# Patient Record
Sex: FEMALE | Race: WHITE | Employment: FULL TIME | URBAN - METROPOLITAN AREA
[De-identification: names, ages, dates, MRNs, and addresses within clinical notes are randomized per-mention and may not be internally consistent; named-entity substitution may affect disease eponyms.]

---

## 2019-01-21 ENCOUNTER — HOSPITAL ENCOUNTER (OUTPATIENT)
Dept: LAB | Age: 49
Discharge: HOME | End: 2019-01-21

## 2019-01-21 PROCEDURE — 88305 TISSUE EXAM BY PATHOLOGIST: CPT

## 2022-02-10 ENCOUNTER — HOSPITAL ENCOUNTER (OUTPATIENT)
Dept: PHYSICAL THERAPY | Age: 52
Discharge: HOME | End: 2022-02-10
Attending: FAMILY MEDICINE
Payer: COMMERCIAL

## 2022-02-10 DIAGNOSIS — M54.16 LUMBAR RADICULOPATHY: ICD-10-CM

## 2022-02-10 PROCEDURE — 97110 THERAPEUTIC EXERCISES: CPT

## 2022-02-10 PROCEDURE — 97162 PT EVAL MOD COMPLEX 30 MIN: CPT

## 2022-02-10 NOTE — THERAPY EVALUATION
Tracy Acosta  : 1970  Primary: Trevor Wilkins o  Secondary:  [address]  at [facility] NICK HART  [address] [facility], [address] [place] [address],[address], [facility] [address].  [phone]   [phone]       OUTPATIENT PHYSICAL THERAPY:Initial Assessment 2/10/2022     ICD-10: Treatment Diagnosis: Lumbar radiculopathy [M54.16] ,M54.50    Low back pain, unspecified,M25. 552 Pain in L hip,M54.9 Upper back pain  Precautions/Allergies:   Simvastatin   TREATMENT PLAN:  Effective Dates: 2/10/2022 TO 2022 (90 days). Frequency/Duration: 2 times a week for 90 Day(s) MEDICAL/REFERRING DIAGNOSIS:  Lumbar radiculopathy [M54.16]   DATE OF ONSET: 2021  REFERRING PHYSICIAN: Ke Mujica, *  MD Orders: Deepa Stubbs and treat  Return MD Appointment: 22     INITIAL ASSESSMENT:  Ms. Junior Cook presents with R sided low back pain onset 2021, L hip pain onset about a month ago and upper back pain on set 2021. She is a good candidate for PT for goals listed below. PROBLEM LIST (Impacting functional limitations):  1. Increased Pain  2. Decreased Activity Tolerance  3. Decreased Flexibility/Joint Mobility  4. Decreased Porter with Home Exercise Program INTERVENTIONS PLANNED: (Treatment may consist of any combination of the following)  1. Home Exercise Program (HEP)  2. Manual Therapy  3. Therapeutic Exercise/Strengthening     GOALS: (Goals have been discussed and agreed upon with patient.)  Short-Term Functional Goals: Time Frame: 2 weeks  1. Pt independent with HEP and sitting, sleeping and standing positions for healthy back. 2. Pt to report a decrease in symptoms- either intensity or frequency  Discharge Goals: Time Frame: 6-8 weeks  1. Spine motions WNL to allow for less pain with daily activities. 2. Pt able to walk 10-15 minutes level ground without increase in pain. 3. Pt to report ability to stand for work without increase in pain. 4. Improve Oswestry score by 5 or greater  5.  Independent with HEP to maintain gains made in PT    OUTCOME MEASURE:   Tool Used: Modified Oswestry Low Back Pain Questionnaire  Score:  Initial: 11/50  Most Recent: X/50 (Date: -- )   Interpretation of Score: Each section is scored on a 0-5 scale, 5 representing the greatest disability. The scores of each section are added together for a total score of 50. MEDICAL NECESSITY:   · Patient is expected to demonstrate progress in strength, range of motion and functional technique to decrease pain and improve function. .  REASON FOR SERVICES/OTHER COMMENTS:  · Patient continues to require skilled intervention due to pain in multiple joints effecting daily activities. .  Total Duration: 55 minutes  PT Patient Time In/Time Out  Time In: 0945  Time Out: 1040    Rehabilitation Potential For Stated Goals: Good  Regarding Silvester Lundborg Miller's therapy, I certify that the treatment plan above will be carried out by a therapist or under their direction. Thank you for this referral,    Corinna Mcallister, PT      Referring Physician Signature: Lisa Musa, * _______________________________ Date _____________       PAIN/SUBJECTIVE:   Initial:    J1/10 low back pain,  1/10 hip  2/10 upper back  now Post Session:  0/10   HISTORY:   History of Injury/Illness (Reason for Referral): Pt presents with pain in R low back pain and L hip pain. Low back pain is mostly in standing and hip pain is mostly when sitting or lying down. Has to change positions constantly when sitting to relieve hip pain. Pt also complains of tightness in upper back trapezius area. Pt states it was worse st the beginning of Covid. 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 Other time is sitting in front of computer. Low back burning pain is the worst.  She previously walked for exercise but can only walk on level surfaces.   Pt states pain stops her from walking but after 2-3 minutes of sitting can begin walking again. Past Medical History/Comorbidities:   Ms. Becca Raya  has a past medical history of Elevated C-reactive protein (CRP) (01/31/2022), High cholesterol, and Kidney stones, calcium oxalate. Ms. Becca Raya  has a past surgical history that includes hx other surgical (Right); hx other surgical; and hx colonoscopy. Social History/Living Environment:     lives with  and family. Stairs to bedroom in home  Prior Level of Function/Work/Activity:   was managing activities with no pain. Ambulatory/Rehab Services H2 Model Falls Risk Assessment   Risk Factors:       No Risk Factors Identified Ability to Rise from Chair:       (1)  Pushes up, successful in one attempt   Falls Prevention Plan:       No modifications necessary   Total: (5 or greater = High Risk): 1   ©2010 [facility]. All Rights Reserved. [facility] Patent #[id number]. Federal Law prohibits the replication, distribution or use without written permission from [facility] [employer]   Current Medications:       Current Outpatient Medications:     atorvastatin (LIPITOR) 20 mg tablet, Take 1 Tablet by mouth daily. , Disp: 90 Tablet, Rfl: 0    prazosin (MINIPRESS) 2 mg capsule, 1-2 tabs by mouth at bedtime, Disp: 90 Capsule, Rfl: 0   Date Last Reviewed:  2/10/2022   Number of Personal Factors/Comorbidities that affect the Plan of Care: 1-2: MODERATE COMPLEXITY   EXAMINATION:   Observation/Orthostatic Postural Assessment:          Pt ambulates with normal gait pattern.   Reports stiff getting up from sitting after sitting long periods  Palpation:          None today  ROM:          Trunk - flexion - finger tips to shin- increase in back pain                   Ext- 70% normal - back pain across LB                  L lateral glide-70%  Increase in R LB pain                  R lateral glide- 70% increase in L hip pain  Strength:          LE strength - grossly WNL  Slight decrease in strength R to L in HS, ant tib and hip abd  Functional Mobility: Transfers:  Independent         Bed Mobility:  No pain lying prone  Or supine with knees bent  Repeated motions-Standing extensions- no real change in symptoms                               Prone press ups- decrease in R LB pain when sitting  Possible extension responder will start extension ex and monitor     Body Structures Involved:  1. Nerves  2. Joints  3. Muscles Body Functions Affected:  1. Neuromusculoskeletal  2. Movement Related Activities and Participation Affected:  1. General Tasks and Demands  2.  Mobility   Number of elements (examined above) that affect the Plan of Care: 3: MODERATE COMPLEXITY   CLINICAL PRESENTATION:   Presentation: Evolving clinical presentation with changing clinical characteristics: MODERATE COMPLEXITY   CLINICAL DECISION MAKING:   Use of outcome tool(s) and clinical judgement create a POC that gives a: Questionable prediction of patient's progress: MODERATE COMPLEXITY

## 2022-02-10 NOTE — PROGRESS NOTES
Aster Alberts  : 1970  Payor: Js Medina / Plan: Renita Ace HMO / Product Type: HMO /  [address] at [facility] NICK HART  [facility], [address], [address] [place] [address]  [phone]   [phone]       OUTPATIENT PHYSICAL THERAPY: Daily Treatment Note 2/10/2022  Visit Count: 1     ICD-10: Treatment Diagnosis:  Lumbar radiculopathy [M54.16] ,M54.50    Low back pain, unspecified,M25. 552 Pain in L hip,M54.9 Upper back pain  Precautions/Allergies:   Simvastatin   TREATMENT PLAN:  Effective Dates: 2/10/2022 TO 2022 (90 days). Frequency/Duration: 2 times a week for 90 Day(s) MEDICAL/REFERRING DIAGNOSIS:  Lumbar radiculopathy [M54.16]   DATE OF ONSET: 2021  REFERRING PHYSICIAN: Lynn Padron, *  MD Orders: Val Oneal and treat  Return MD Appointment: 22       Pre-treatment Symptoms/Complaints:  Pt with R LB pain, L hip pain and upper back pain  Pain: Initial:   1/10 today Post Session:  010   Medications Last Reviewed:  2/10/2022    Updated Objective Findings:   See evaluation note from today     TREATMENT:     THERAPEUTIC EXERCISE: (15 minutes):  Exercises per grid below to improve mobility. Required minimal visual and verbal cues to promote proper body alignment, promote proper body posture and promote proper body mechanics. Progressed range and repetitions as indicated. Date:  2/10/22 Date:   Date:     Activity/Exercise Parameters Parameters Parameters   Standing trunk extension 1 x 10     Prone press up 1 x 10                                   Discussed proper sitting and sleeping position  Avoiding flexed positions      HEP: trunk ext every 2-3 hours 10 reps  [employer] Portal    Treatment/Session Summary:    · Response to Treatment:  Pt had decrease in pain in sitting after performing extensions. Also no pain in prone position.   · Communication/Consultation:  None today  · Equipment provided today:  None today  · Recommendations/Intent for next treatment session: Next visit will focus on spine mobility and functional activities. .    Total Treatment Billable Duration:  15 minutes therapeutic exercise.   PT Patient Time In/Time Out  Time In: 0945  Time Out: [address], PT    Future Appointments   Date Time Provider Carolyn Arana   2/16/2022 11:20 AM Shweta Philip DO [facility] PRE PRE   2/16/2022  1:45 PM Lynita Chris, PT [facility]   2/18/2022  1:45 PM Abi Rist, Abbi, PT [facility]   2/21/2022  1:00 PM Abi Rist, Abbi, PT [facility]   2/23/2022 10:30 AM Lynita Chris, PT [facility]   3/9/2022  9:45 AM Abi Rist, Abbi, PT [facility]   3/11/2022  9:45 AM Lynita Chris, PT [facility]   3/16/2022  9:45 AM Lynita Chris, PT [facility]   3/18/2022  9:45 AM Lynita Chris, PT [facility]   3/23/2022  9:45 AM Lynita Chris, PT [facility]   3/25/2022  9:00 AM Lynita Chris, PT [facility]   3/25/2022 10:30 AM Ira Reece MD [facility] CMW CMW   3/30/2022  9:45 AM Lynita Chris, PT [facility]   4/1/2022  9:45 AM Lynita Chris, PT [facility]   5/16/2022  8:40 AM Madelin Astorga MD [address] [facility]

## 2022-02-16 ENCOUNTER — APPOINTMENT (OUTPATIENT)
Dept: PHYSICAL THERAPY | Age: 52
End: 2022-02-16
Attending: FAMILY MEDICINE
Payer: COMMERCIAL

## 2022-02-17 ENCOUNTER — HOSPITAL ENCOUNTER (OUTPATIENT)
Dept: LAB | Age: 52
Discharge: HOME | End: 2022-02-17

## 2022-02-17 PROCEDURE — 88305 TISSUE EXAM BY PATHOLOGIST: CPT

## 2022-02-18 ENCOUNTER — HOSPITAL ENCOUNTER (OUTPATIENT)
Dept: PHYSICAL THERAPY | Age: 52
Discharge: HOME | End: 2022-02-18
Attending: FAMILY MEDICINE
Payer: COMMERCIAL

## 2022-02-18 PROCEDURE — 97110 THERAPEUTIC EXERCISES: CPT

## 2022-02-18 NOTE — PROGRESS NOTES
Tracy Acosta  : 1970  Payor: Jeffrey King / Plan: Deejay Michaud HMO / Product Type: HMO /  [address]  at [facility] NICK HART  [address] [facility], [address], [facility].  [phone]   [phone]       OUTPATIENT PHYSICAL THERAPY: Daily Treatment Note 2022  Visit Count: 2     ICD-10: Treatment Diagnosis:  Lumbar radiculopathy [M54.16] ,M54.50    Low back pain, unspecified,M25. 552 Pain in L hip,M54.9 Upper back pain  Precautions/Allergies:   Simvastatin   TREATMENT PLAN:  Effective Dates: 2/10/2022 TO 2022 (90 days). Frequency/Duration: 2 times a week for 90 Day(s) MEDICAL/REFERRING DIAGNOSIS:  Radiculopathy, lumbar region [M54.16]   DATE OF ONSET: 2021  REFERRING PHYSICIAN: Ke Mujica, *  MD Orders: Deepa Stubbs and treat  Return MD Appointment: 22       Pre-treatment Symptoms/Complaints:  Pt states the muscle aches or fatigued feeling in her LE is more. Almost constant. The exercises relieve the burning sensation. Pain: Initial:   1/10 today Post Session:  0/10   Medications Last Reviewed:  2022    Updated Objective Findings: Tight quads R more than L       TREATMENT:     THERAPEUTIC EXERCISE: (15 minutes):  Exercises per grid below to improve mobility. Required minimal visual and verbal cues to promote proper body alignment, promote proper body posture and promote proper body mechanics. Progressed range and repetitions as indicated. Date:  2/10/22 Date:  22 Date:     Activity/Exercise Parameters Parameters Parameters   Standing trunk extension 1 x 10 1 x 10    Prone press up 1 x 10 2 x 5    Prone press up with therapist overpress  1 x 5    Quad stretching wit strap   3 x 30  each    Calf stretching  3 x 30                      HEP: trunk ext every 2-3 hours 10 reps, quad stretching , calf stretch   [employer] Portal    Treatment/Session Summary:    · Response to Treatment:  Pt reported less fatigue feeling after stretching. · Communication/Consultation:  None today  · Equipment provided today:  None today  · Recommendations/Intent for next treatment session: Next visit will focus on spine mobility and functional activities. .    Total Treatment Billable Duration:  30 minutes therapeutic exercise.   PT Patient Time In/Time Out  Time In: 0145  Time Out: 0230    Rochelle Cohen PT    Future Appointments   Date Time Provider Carolyn Arana   2/21/2022  1:00 PM Chloe Garduno, PT [facility]   2/23/2022 10:30 AM Chloe Garduno, PT [facility]   3/9/2022  9:45 AM Abbi Velasquez, PT [facility]   3/11/2022  9:45 AM Abbi Velasquez, PT [facility]   3/16/2022  9:45 AM Nuris Epps, PT [facility]   3/18/2022  9:45 AM Nuris Epps, PT [facility]   3/23/2022  9:45 AM Chloe Garduno, PT [facility]   3/25/2022  9:00 AM Chloe Garduno, PT [facility]   3/25/2022 10:30 AM Sandra Yuan MD [facility] CMW CMW   3/30/2022  9:45 AM Chloe Garduno, PT [facility]   4/1/2022  9:45 AM Chloe Garduno, PT [facility]   5/16/2022  8:40 AM Patti Sevilla MD [address] [place] [address]

## 2022-02-21 ENCOUNTER — APPOINTMENT (OUTPATIENT)
Dept: PHYSICAL THERAPY | Age: 52
End: 2022-02-21
Attending: FAMILY MEDICINE
Payer: COMMERCIAL

## 2022-02-23 ENCOUNTER — HOSPITAL ENCOUNTER (OUTPATIENT)
Dept: PHYSICAL THERAPY | Age: 52
End: 2022-02-23
Attending: FAMILY MEDICINE
Payer: COMMERCIAL

## 2022-03-09 ENCOUNTER — HOSPITAL ENCOUNTER (OUTPATIENT)
Dept: PHYSICAL THERAPY | Age: 52
Discharge: HOME | End: 2022-03-09
Attending: FAMILY MEDICINE
Payer: COMMERCIAL

## 2022-03-09 PROCEDURE — 97110 THERAPEUTIC EXERCISES: CPT

## 2022-03-09 NOTE — PROGRESS NOTES
Franki Casas  : 1970  Payor: Anderson Rout / Plan: Carlos Enrique Bhardwaj HMO / Product Type: HMO /  [address] at [facility] NICK HART  [address] [place] [address], [address], [place] [address].  [phone]   [phone]       OUTPATIENT PHYSICAL THERAPY: Daily Treatment Note 3/9/2022  Visit Count: 3     ICD-10: Treatment Diagnosis:  Lumbar radiculopathy [M54.16] ,M54.50    Low back pain, unspecified,M25. 552 Pain in L hip,M54.9 Upper back pain  Precautions/Allergies:   Simvastatin   TREATMENT PLAN:  Effective Dates: 2/10/2022 TO 2022 (90 days). Frequency/Duration: 2 times a week for 90 Day(s) MEDICAL/REFERRING DIAGNOSIS:  Radiculopathy, lumbar region [M54.16]   DATE OF ONSET: 2021  REFERRING PHYSICIAN: Francy Ferrera, *  MD Orders: Raeann Bernard and treat  Return MD Appointment: 22       Pre-treatment Symptoms/Complaints:  Pt states she is having less back pain while working. [place] and was able to walk about 20 minutes but then the burning sensation was severe from low back. She states the fatigue in her LE is better she feels like the exercises are helping with that. Pain: Initial:   2/10 today Post Session:  0/10   Medications Last Reviewed:  3/9/2022    Updated Objective Findings:      TREATMENT:     THERAPEUTIC EXERCISE: (35 minutes):  Exercises per grid below to improve mobility and strength. Required minimal visual and verbal cues to promote proper body alignment, promote proper body posture and promote proper body mechanics. Progressed resistance, range and repetitions as indicated.    Date:  2/10/22 Date:  22 Date:  3/9/22   Activity/Exercise Parameters Parameters Parameters   Standing trunk extension 1 x 10 1 x 10 1 x 10   Prone press up 1 x 10 2 x 5 1 x 10   Prone press up with therapist overpress  1 x 5    Quad stretching wit strap   3 x 30  each 10 active with therapist overpressure   Calf stretching  3 x 30    bridging   10   oppos extremity ext in quadraped   10 each side   Clam shell   10 each side               HEP: added bridging, bird dog, clamshell for strength  Med[employer] Portal    Treatment/Session Summary:    · Response to Treatment:  Pain has improved. Did well with new ex. · Communication/Consultation:  None today  · Equipment provided today:  None today  · Recommendations/Intent for next treatment session: Next visit will focus on spine mobility and functional activities. .    Total Treatment Billable Duration:  30 minutes therapeutic exercise.        Delia Caceres, PT    Future Appointments   Date Time Provider Carolyn Arana   3/9/2022  9:45 AM Stana Lurdes, PT [facility]   3/11/2022  9:45 AM Stana Lurdes, PT [facility]   3/16/2022  9:45 AM Bridget Starcher, PT [facility]   3/18/2022  9:45 AM Bridget Starcher, PT [facility]   3/23/2022  9:45 AM Stana Lurdes, PT [facility]   3/25/2022  9:00 AM Stana Lurdes, PT [facility]   3/25/2022 10:30 AM Padmini Grande MD [facility] CMW CMW   3/30/2022  9:45 AM Stana Lurdes, PT [facility]   2022  9:45 AM Stana Lurdes, PT [facility]   2022  8:40 AM Tonny Snyder MD 1300 [facility]

## 2022-03-11 ENCOUNTER — APPOINTMENT (OUTPATIENT)
Dept: PHYSICAL THERAPY | Age: 52
End: 2022-03-11
Attending: FAMILY MEDICINE
Payer: COMMERCIAL

## 2022-03-16 ENCOUNTER — HOSPITAL ENCOUNTER (OUTPATIENT)
Dept: PHYSICAL THERAPY | Age: 52
Discharge: HOME | End: 2022-03-16
Attending: FAMILY MEDICINE
Payer: COMMERCIAL

## 2022-03-16 PROCEDURE — 97110 THERAPEUTIC EXERCISES: CPT

## 2022-03-16 NOTE — PROGRESS NOTES
Helena Zhu  : 1970  Payor: Ken Thomas / Plan: Nolia Sol HMO / Product Type: HMO /  [address] at [facility] NICK HART  1101 [facility], [address], [place] [address].  [phone]   [phone]       OUTPATIENT PHYSICAL THERAPY: Daily Treatment Note 3/16/2022  Visit Count: 4     ICD-10: Treatment Diagnosis:  Lumbar radiculopathy [M54.16] ,M54.50    Low back pain, unspecified,M25. 552 Pain in L hip,M54.9 Upper back pain  Precautions/Allergies:   Simvastatin   TREATMENT PLAN:  Effective Dates: 2/10/2022 TO 2022 (90 days). Frequency/Duration: 2 times a week for 90 Day(s) MEDICAL/REFERRING DIAGNOSIS:  Radiculopathy, lumbar region [M54.16]   DATE OF ONSET: 2021  REFERRING PHYSICIAN: Obed Morley, *  MD Orders: Rayna Dorantes and treat  Return MD Appointment: 22       Pre-treatment Symptoms/Complaints: Says her pain is low intensity. Stays pretty constant. Pain is mostly to R lumbosacral spine. Has been doing her HEP and feels these help. Pain: Initial:   1/10 today Post Session:  0/10   Medications Last Reviewed:  3/16/2022    Updated Objective Findings:   No new measure. TREATMENT:     THERAPEUTIC EXERCISE: (45 Minutes): Instruction and performance of lumbar mobility exercises with hook-lying pelvic tilts, hip shift pelvic pull backs, and lower trunk rotations, x10-15 each. Assisted LE stretching with Active Isolated Stretching to R and L posterior hip, hamstrings in 90/90 and SLR, lateral hip, gastroc and hip flexors/quads in side-lying, 3\"x10 each; assisted sciatic nerve tensioner in supine 90/90 and SLR with active ankle dorsiflexion, 3\"x10 each. Instruction in active hamstring stretch in supine 90/90. Good return demonstration. Reviewed and performed side-lying bent knee hip abd/ER with addition of emphasis on pelvis positioning and increased hold timex 5-10\" x10 each; and bridging with posterior pelvic tilt.     Date:  2/10/22 Date:  22 Date:  3/16/22 Activity/Exercise Parameters Parameters Parameters   Standing trunk extension 1 x 10 1 x 10 -   Prone press up 1 x 10 2 x 5 -   Prone press up with therapist overpress  1 x 5 -   Quad stretching wit strap   3 x 30  each Assisted as above   Calf stretching  3 x 30 Assisted as above     HEP: She is to continue with her existing HEP. Verbal instruction for the new exercises instructed in today. She verbalizes understanding. mention Portal    Treatment/Session Summary:    · Response to Treatment:  Appears to be doing better overall. Good performance of the exercises today. · Communication/Consultation:  None today  · Equipment provided today:  None today  · Recommendations/Intent for next treatment session: Next visit will continue to focus on spine mobility and functional activities.     Total Treatment Billable Duration: 45  minutes  PT Patient Time In/Time Out  Time In:   Time Out: [address] Mosqueda PT    Future Appointments   Date Time Provider Carolyn Arana   3/18/2022  9:45 AM Bridget Walker PT [facility]   3/23/2022  9:45 AM Joby Chatman, PT [facility]   3/25/2022  9:00 AM Joby Chatman, PT [facility]   3/25/2022 10:30 AM Padmini Grande MD [facility] CMW CMW   3/30/2022  9:45 AM Joby Chatman, PT [facility]   2022  9:45 AM Joby Chatman, PT [facility]   2022  8:40 AM Tonny Snyder MD 1300 [facility]

## 2022-03-18 ENCOUNTER — HOSPITAL ENCOUNTER (OUTPATIENT)
Dept: PHYSICAL THERAPY | Age: 52
Discharge: HOME | End: 2022-03-18
Attending: FAMILY MEDICINE
Payer: COMMERCIAL

## 2022-03-18 NOTE — PROGRESS NOTES
Augusto Mention  : 1970  Payor: Darryl Call / Plan: Arlene Miramontes HMO / Product Type: HMO /  [address]  at [facility] NICK HART  1101 [facility], [address] [place] [address],[address], [facility] [address]. 91.  [phone]   [phone]       OUTPATIENT PHYSICAL THERAPY: Cancellation Note 3/18/2022     ICD-10: Treatment Diagnosis:  Lumbar radiculopathy [M54.16];M54.50    Low back pain, unspecified,M25. 552 Pain in L hip,M54.9 Upper back pain  Precautions/Allergies:   Simvastatin   TREATMENT PLAN:  Effective Dates: 2/10/2022 TO 2022 (90 days). Frequency/Duration: 2 times a week for 90 Day(s) MEDICAL/REFERRING DIAGNOSIS:  Radiculopathy, lumbar region [M54.16]   DATE OF ONSET: 2021  REFERRING PHYSICIAN: Kris Jc MD Orders: Alvina Mulligan and treat  Return MD Appointment: 22       Augusto Mention called to cancel her appointment this morning secondary to illness. We will plan to continue with her current treatment plan on her return.      Kenna Bennett, PT    Future Appointments   Date Time Provider Carolyn Arana   3/18/2022  9:45 AM Juan C Albarran, PT [facility]   3/23/2022  9:45 AM Davy Pham PT [facility]   3/25/2022  9:00 AM Davy Pham PT [facility]   3/25/2022 10:30 AM No Harper MD [facility] CMW CMW   3/30/2022  9:45 AM Davy Pham PT [facility]   2022  9:45 AM Davy Pham PT [facility]   2022  8:40 AM Donna Limon MD 1300 [facility]

## 2022-03-30 ENCOUNTER — HOSPITAL ENCOUNTER (OUTPATIENT)
Dept: PHYSICAL THERAPY | Age: 52
Discharge: HOME | End: 2022-03-30
Attending: FAMILY MEDICINE
Payer: COMMERCIAL

## 2022-03-30 NOTE — PROGRESS NOTES
Chet Mays  : 1970  Payor: Yessi Meeks / Plan: Eusebio Lori HMO / Product Type: HMO /  [address]  at [facility] NICK HART  1101 [facility], [address] [place] [address],[address], [facility] U. 91.  [phone]   [phone]       OUTPATIENT PHYSICAL THERAPY: Cancellation Note 3/30/2022     ICD-10: Treatment Diagnosis:  Lumbar radiculopathy [M54.16];M54.50    Low back pain, unspecified,M25. 552 Pain in L hip,M54.9 Upper back pain  Precautions/Allergies:   Simvastatin   TREATMENT PLAN:  Effective Dates: 2/10/2022 TO 2022 (90 days). Frequency/Duration: 2 times a week for 90 Day(s) MEDICAL/REFERRING DIAGNOSIS:  Radiculopathy, lumbar region [M54.16]   DATE OF ONSET: 2021  REFERRING PHYSICIAN: Mya Campo *  MD Orders: Taniya Ingram and treat  Return MD Appointment: 22       Chet Mays cancelled appt this morning. Will continue with plan of treatment on her next visit.     Kiara Bobo, PT    Future Appointments   Date Time Provider Carolyn Bricei   2022  7:00 PM Yaneth Schmitz PT [facility]   2022 11:45 AM MD LESLY Sands CMW CMW   2022  8:40 AM Jeffy Siddiqui MD 1300 [facility]

## 2022-04-01 ENCOUNTER — APPOINTMENT (OUTPATIENT)
Dept: PHYSICAL THERAPY | Age: 52
End: 2022-04-01
Attending: FAMILY MEDICINE

## 2022-05-24 DIAGNOSIS — E78.00 HIGH CHOLESTEROL: Primary | ICD-10-CM

## 2022-05-24 RX ORDER — ATORVASTATIN CALCIUM 20 MG/1
20 TABLET, FILM COATED ORAL DAILY
Qty: 90 TABLET | Refills: 1 | Status: SHIPPED | OUTPATIENT
Start: 2022-05-24 | End: 2022-08-22

## 2022-05-24 NOTE — TELEPHONE ENCOUNTER
She is supposed to f/u in May to rpt her cholesterol panel since being on medicine. Please assist her in scheduling the lab appt. i've refilled med.

## 2022-05-27 NOTE — TELEPHONE ENCOUNTER
I called the patient and spoke to her about this. Appointment for the blood work was made while on the phone.

## 2022-06-21 ENCOUNTER — OFFICE VISIT (OUTPATIENT)
Dept: ORTHOPEDIC SURGERY | Age: 52
End: 2022-06-21
Payer: COMMERCIAL

## 2022-06-21 DIAGNOSIS — M54.50 LOW BACK PAIN, UNSPECIFIED BACK PAIN LATERALITY, UNSPECIFIED CHRONICITY, UNSPECIFIED WHETHER SCIATICA PRESENT: Primary | ICD-10-CM

## 2022-06-21 DIAGNOSIS — I73.9 CLAUDICATION (HCC): ICD-10-CM

## 2022-06-21 DIAGNOSIS — M47.816 FACET ARTHROPATHY, LUMBAR: ICD-10-CM

## 2022-06-21 PROCEDURE — 99203 OFFICE O/P NEW LOW 30 MIN: CPT | Performed by: PHYSICIAN ASSISTANT

## 2022-06-21 NOTE — LETTER
Jose A GONZALEZ  1970  MRN [id number]                                              ROOM NUMBER______      Radiographic Studies:    Cervical MRI      Thoracic MRI         Lumbar MRI          Pelvis MRI        CONTRAST    CT Myelogram: _______________   NCS/EMG ________________ ( UE  /  LE )     MRI of ___________________          Other: ____________________      Injections:    KNEE    HIP  Depomedrol _____ mg Euflexxa _____    _______________ TFESI/SNRB  _______________ SI Joint  _______________ NATIVIDAD    _______________ Facet  _______________Piriformis/ Sciatica      Medications:    Oral Steroids _______________  NSAIDS _______________    Muscle Relaxers _______________  Neurontin/Lyrica _______________    Pain Medicine _______________  Other _______________                       Physical Therapy:    Lumbar     Thoracic      Cervical     Hip       Knee       Shoulder               Traction          Ultrasound          Dry Needling      Referral:    Pain referral:  [facility]   [facility]   Other: ______________________________    Follow-up/ Refer__________________________________________________    Authorization to hold blood thinners:___________________________________

## 2022-06-21 NOTE — PROGRESS NOTES
Name: Gabriele De Leon  YOB: 1970  Gender: female  MRN: [id number]    CC: Back Pain (Right sided low back pain)       HPI: This is a 45y.o. year old female who resents with 2-year history of right-sided lower back pain. There is been no particular injury. Pain is mostly in the right of the lower back and buttock. Does not radiate the legs however she does describe inflammation in both of her legs. On further questioning, she reports that her legs feel tired and fatigued after walking or standing. 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 She takes anti-inflammatory supplements. She had cotherapy during the months of March and April 2022 without significant relief. She has had 1 year of chiropractic care. She denies numbness or tingling. She does tell me she is seeing a rheumatologist to evaluate for inflammatory problems and the fatigue in her legs. History was obtained by patient    This patient  has not had lumbar surgery in the past.      AMB PAIN ASSESSMENT 6/21/2022   Location of Pain Back   Location Modifiers Right   Quality of Pain Aching   Duration of Pain Persistent   Frequency of Pain Intermittent   Date Pain First Started 5/20/2020   Aggravating Factors Standing; Other (Comment)   Limiting Behavior Yes   Relieving Factors Other (Comment)   Result of Injury No   Work-Related Injury No   Are there other pain locations you wish to document? No            ROS/Meds/PSH/PMH/FH/SH: I personally reviewed the patient's collected intake data.   Below are the pertinents:    Allergies   Allergen Reactions    Simvastatin Other (See Comments)         Current Outpatient Medications:     atorvastatin (LIPITOR) 20 MG tablet, Take 1 tablet by mouth daily, Disp: 90 tablet, Rfl: 1    ascorbic acid (VITAMIN C) 250 MG tablet, Take by mouth, Disp: , Rfl:     coenzyme Q10 100 MG CAPS capsule, Take 100 mg by mouth daily, Disp: , Rfl:     Past Surgical History:   Procedure Laterality Date    COLONOSCOPY      2019    OTHER SURGICAL HISTORY Right     infection in breast - surgically removed.  OTHER SURGICAL HISTORY      cerclage X2    SKIN BIOPSY         Patient Active Problem List   Diagnosis    High cholesterol         Tobacco:  reports that she has been smoking. She has been smoking about 0.50 packs per day. She has never used smokeless tobacco.  Alcohol:   Social History     Substance and Sexual Activity   Alcohol Use Yes        Physical Exam:   @VS1@   GENERAL:  Adult in no acute distress, moderately obese Patient is appropriately conversant  MSK:  Examination of the lumbar spine reveals no tenderness    There is no tenderness to palpation along the spinous processes and paraspinal musculature. The patient ambulates with a normal gait. ROM of bilateral hip(s) reveals no irritability. NEURO:  Cranial nerves grossly intact. No motor deficits. Straight leg testing is negative bilateral  Sensory testing reveals intact sensation to light touch and in the distribution of the L3-S1 dermatomes bilaterally  Ankle jerk is negative for clonus    Reflexes   Right Left   Quadriceps (L4) 2 2   Achilles (S1) 2 2     Strength testing in the lower extremity reveals the following based on the 5 point grading scale:     HF (L2) H Ab (L5) KE (L3/4) ADF (L4) EHL (L5) A Ev (S1) APF (S1)   Right 5 5 5 5 5 5 5   Left 5 5 5 5 5 5 5     PSYCH:  Alert and oriented X 3. Appropriate affect. Intact judgment and insight. Radiographic Studies:     AP, lateral and spot views of the lumbar spine:  6/21/22    AP of the lumbar spine shows normal alignment. Sagittal view reveals facet arthropathy and mild degenerative changes. No spondylolisthesis. No fractures noted  x-ray impression: Lumbar facet arthropathy        Assessment/Plan:         Diagnosis Orders   1.  Low back pain, unspecified back pain laterality, unspecified chronicity, unspecified whether sciatica present  XR LUMBAR SPINE (2-3 VIEWS)    MRI LUMBAR SPINE WO CONTRAST   2. Facet arthropathy, lumbar  MRI LUMBAR SPINE WO CONTRAST   3. Claudication [facility])  MRI LUMBAR SPINE WO CONTRAST         This patient's clinical history and physical exam is consistent with neurogenic claudication which is likely due to lumbar stenosis. I discussed the natural history of lumbar stenosis in that it is a degenerative condition that is usually slowly progressive resulting in gradual loss of mobility. I reassured the patient that this is not a condition that typically predisposes her   to an acute paraplegia; however, the more neurologic deficits acquired and the longer they go untreated, the less likely she is to have neurological improvements after an operation. She understands that conservative treatments typically include physical therapy, oral and/or epidural steroids, pain management, and simple observation. And, that these treatments do not address the anatomical pinching of the spinal nerves, but rather help patients cope with the resulting symptoms. I also discussed potential surgical intervention if the symptoms fail to improve or there is a progressive neurologic deficit or conservative management has been exhausted. - A MRI was ordered to delineate anatomy, confirm the diagnosis and assess the severity. No orders of the defined types were placed in this encounter. Orders Placed This Encounter   Procedures    XR LUMBAR SPINE (2-3 VIEWS)    MRI LUMBAR SPINE WO CONTRAST        4 This is a undiagnosed new problem with uncertain prognosis      Return for MRI results [facility] ELIZABETH Estrada PA-C  06/21/22      Elements of this note were created using speech recognition software. As such, errors of speech recognition may be present.

## 2022-06-28 DIAGNOSIS — E78.00 HIGH CHOLESTEROL: ICD-10-CM

## 2022-06-29 ENCOUNTER — OFFICE VISIT (OUTPATIENT)
Dept: ORTHOPEDIC SURGERY | Age: 52
End: 2022-06-29
Payer: COMMERCIAL

## 2022-06-29 DIAGNOSIS — D17.79 EPIDURAL LIPOMATOSIS: ICD-10-CM

## 2022-06-29 DIAGNOSIS — M47.816 FACET ARTHROPATHY, LUMBAR: Primary | ICD-10-CM

## 2022-06-29 PROCEDURE — 99214 OFFICE O/P EST MOD 30 MIN: CPT | Performed by: PHYSICIAN ASSISTANT

## 2022-06-29 NOTE — LETTER
Kelli Oseguera                                                     BRIA GONZALEZ  1970  MRN [id number]                                              ROOM NUMBER______      Radiographic Studies:    Cervical MRI      Thoracic MRI         Lumbar MRI          Pelvis MRI        CONTRAST    CT Myelogram: _______________   NCS/EMG ________________ ( UE  /  LE )     MRI of ___________________          Other: ____________________      Injections:    KNEE    HIP  Depomedrol _____ mg Euflexxa _____    _______________ TFESI/SNRB  _______________ SI Joint  _______________ NATIVIDAD    _______________ Facet  _______________Piriformis/ Sciatica      Medications:    Oral Steroids _______________  NSAIDS _______________    Muscle Relaxers _______________  Neurontin/Lyrica _______________    Pain Medicine _______________  Other _______________                       Physical Therapy:    Lumbar     Thoracic      Cervical     Hip       Knee       Shoulder               Traction          Ultrasound          Dry Needling      Referral:    Pain referral:  [facility]   [facility]   Other: ______________________________    Follow-up/ Refer__________________________________________________    Authorization to hold blood thinners:___________________________________

## 2022-06-29 NOTE — PROGRESS NOTES
Name: Sebastian Hartmann  YOB: 1970  Gender: female  MRN: [id number]    CC: Back Problem (follow up mri)       HPI: This is a 45y.o. year old female who presented with 2-year history of right-sided lower back pain. There is been no particular injury. Pain is mostly in the right of the lower back and buttock. Does not radiate the legs however she does describe inflammation in both of her legs. On further questioning, she reports that her legs feel tired and fatigued after walking or standing. 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 She takes anti-inflammatory supplements. She had therapy during the months of March and April 2022 without significant relief. She has had 1 year of chiropractic care. She denies numbness or tingling. She does tell me she is seeing a rheumatologist to evaluate for inflammatory problems and the fatigue in her legs. History was obtained by patient    This patient  has not had lumbar surgery in the past.     MRI scan of the lumbar spine was ordered     AMB PAIN ASSESSMENT 6/21/2022   Location of Pain Back   Location Modifiers Right   Quality of Pain Aching   Duration of Pain Persistent   Frequency of Pain Intermittent   Date Pain First Started 5/20/2020   Aggravating Factors Standing; Other (Comment)   Limiting Behavior Yes   Relieving Factors Other (Comment)   Result of Injury No   Work-Related Injury No   Are there other pain locations you wish to document? No            ROS/Meds/PSH/PMH/FH/SH: I personally reviewed the patient's collected intake data.   Below are the pertinents:    Allergies   Allergen Reactions    Simvastatin Other (See Comments)         Current Outpatient Medications:     atorvastatin (LIPITOR) 20 MG tablet, Take 1 tablet by mouth daily, Disp: 90 tablet, Rfl: 1    ascorbic acid (VITAMIN C) 250 MG tablet, Take by mouth, Disp: , Rfl:     coenzyme Q10 100 MG CAPS capsule, Take 100 mg by mouth daily, Disp: , Rfl:     Past Surgical History:   Procedure Laterality Date    COLONOSCOPY      2019    OTHER SURGICAL HISTORY Right     infection in breast - surgically removed.  OTHER SURGICAL HISTORY      cerclage X2    SKIN BIOPSY         Patient Active Problem List   Diagnosis    High cholesterol    Facet arthropathy, lumbar         Tobacco:  reports that she has been smoking. She has been smoking about 0.50 packs per day. She has never used smokeless tobacco.  Alcohol:   Social History     Substance and Sexual Activity   Alcohol Use Yes        Physical Exam:   @VS1@   GENERAL:  Adult in no acute distress, moderately obese Patient is appropriately conversant  MSK:  Examination of the lumbar spine reveals no tenderness    There is no tenderness to palpation along the spinous processes and paraspinal musculature. The patient ambulates with a normal gait. ROM of bilateral hip(s) reveals no irritability. NEURO:  Cranial nerves grossly intact. No motor deficits. Straight leg testing is negative bilateral  Sensory testing reveals intact sensation to light touch and in the distribution of the L3-S1 dermatomes bilaterally  Ankle jerk is negative for clonus    Reflexes   Right Left   Quadriceps (L4) 2 2   Achilles (S1) 2 2     Strength testing in the lower extremity reveals the following based on the 5 point grading scale:     HF (L2) H Ab (L5) KE (L3/4) ADF (L4) EHL (L5) A Ev (S1) APF (S1)   Right 5 5 5 5 5 5 5   Left 5 5 5 5 5 5 5     PSYCH:  Alert and oriented X 3. Appropriate affect. Intact judgment and insight. Radiographic Studies:     AP, lateral and spot views of the lumbar spine:  6/21/22    AP of the lumbar spine shows normal alignment. Sagittal view reveals facet arthropathy and mild degenerative changes. No spondylolisthesis. No fractures noted  x-ray impression: Lumbar facet arthropathy      MRI Result (most recent):   MRI LUMBAR SPINE WO CONTRAST 06/28/2022    Narrative  MR OF THE LUMBAR SPINE WITHOUT CONTRAST. Clinical Indication: Lower back pain for 1 1/2 years    Procedure: Multiplanar and multisequence MR images are performed of the lumbar  spine without gadolinium contrast.    Comparison: No prior    Findings: The lumbar vertebral bodies are normal in height and signal. There is  no compression fracture. No aggressive bone lesion identified. The disc spaces  are maintained. The marrow signal in the sacrum is normal. The conus medullaris  is normal in morphology and signal terminating in expected position at L1. Axial images:    L1-L2: No central stenosis or foramina narrowing. L2-L3: No central stenosis or foramina narrowing. L3-L4: No central stenosis or foramina narrowing. L4-L5: Mild degenerative facet changes are present. There is no central  stenosis. Mild bilateral foramina narrowing present. L5-S1: Mild degenerative facet changes are present with prominent epidural fat. There is no deflection of the traversing nerve roots. Mild bilateral foramina  narrowing is present. Limited evaluation the paraspinal soft tissues is unremarkable. The hip joints  are normal.    Impression  1. Mild degenerative facet arthropathy at L4-L5 and L5-S1 resulting in mild  bilateral foramina are not. 2. No acute musculoskeletal abnormality or central spinal stenosis. I independently reviewed the MRI of the lumbar spine. She has facet arthropathy L4-5 and L5-S1. She does not have significant stenosis. There is some epidural fat lipomatosis of L5-S1 but no significant spinal stenosis. Assessment/Plan:      ICD-10-CM    1. Facet arthropathy, lumbar  M47.816    2. Epidural lipomatosis  D17.79         This patient's clinical history and physical exam is consistent with facetogenic back pain. I discussed with her the natural history of this condition in that most episodes are typically self limited.   However, the symptoms can last for several months if not even longer. What I currently recommend is that she continues with conservative treatments to help cope with the symptoms and avoid having back surgery at this time. She understands that conservative treatments typically include activity modification, NSAIDs and physical therapy. Oral and/or epidural steroids could be considered in resistant scenarios. Also, she  may want to explore chiropractic care or acupuncture. I advised to avoid any prolonged bedrest and to try to maintain ADLs as much as possible. The patient was counseled to follow up me should she  develop any neurologic symptoms such as leg pain.      -The patient will be treated observantly with self directed symptomatic care. Instructions were given to follow up if there is any neurologic or functional decline. - A home exercise program was prescribed for stretching and strengthening. A list of exercises was provided.   -Weight Loss: We discussed that pain and problems in the lower back may be aggravated by obesity. Overweight patients experience pain from degenerative disc and sometimes herniated discs. The effectiveness of back surgery can also be affected by a patient's weight. Obese patients are higher risk of infections and failure of spinal hardware due to weight stresses. We discussed that attention to weight loss before undergoing surgery may improve the healing process and overall outcome. Weight loss options include: diet, exercise, nutritional support and bariatric surgery. If symptoms do not improve, would recommend referral to pain management for consideration of facet injections. 4 This is a chronic illness/condition with exacerbation and progression      No follow-ups on file. Jasbir Chamberlain PA-C  07/01/22      Elements of this note were created using speech recognition software. As such, errors of speech recognition may be present.

## 2022-07-08 ENCOUNTER — NURSE ONLY (OUTPATIENT)
Dept: RHEUMATOLOGY | Age: 52
End: 2022-07-08

## 2022-07-08 DIAGNOSIS — M47.816 FACET ARTHROPATHY, LUMBAR: Primary | ICD-10-CM

## 2022-07-08 DIAGNOSIS — E55.9 HYPOVITAMINOSIS D: ICD-10-CM

## 2022-07-08 DIAGNOSIS — R29.898 OTHER SYMPTOMS AND SIGNS INVOLVING THE MUSCULOSKELETAL SYSTEM: ICD-10-CM

## 2022-07-08 DIAGNOSIS — R79.82 ELEVATED C-REACTIVE PROTEIN (CRP): ICD-10-CM

## 2022-07-08 DIAGNOSIS — M54.9 DORSALGIA: ICD-10-CM

## 2022-07-26 ENCOUNTER — NURSE ONLY (OUTPATIENT)
Dept: RHEUMATOLOGY | Age: 52
End: 2022-07-26

## 2022-07-26 DIAGNOSIS — E55.9 HYPOVITAMINOSIS D: ICD-10-CM

## 2022-07-26 DIAGNOSIS — R29.898 OTHER SYMPTOMS AND SIGNS INVOLVING THE MUSCULOSKELETAL SYSTEM: ICD-10-CM

## 2022-07-26 DIAGNOSIS — R79.82 ELEVATED C-REACTIVE PROTEIN (CRP): ICD-10-CM

## 2022-07-26 DIAGNOSIS — M54.9 DORSALGIA: ICD-10-CM

## 2022-07-26 LAB
25(OH)D3 SERPL-MCNC: 27.9 NG/ML (ref 30–100)
ALBUMIN SERPL-MCNC: 3.9 G/DL (ref 3.5–5)
ALBUMIN/GLOB SERPL: 1.2 {RATIO} (ref 1.2–3.5)
ALP SERPL-CCNC: 119 U/L (ref 50–136)
ALT SERPL-CCNC: 53 U/L (ref 12–65)
ANION GAP SERPL CALC-SCNC: 9 MMOL/L (ref 7–16)
AST SERPL-CCNC: 34 U/L (ref 15–37)
BASOPHILS # BLD: 0 K/UL (ref 0–0.2)
BASOPHILS NFR BLD: 0 % (ref 0–2)
BILIRUB SERPL-MCNC: 0.6 MG/DL (ref 0.2–1.1)
BUN SERPL-MCNC: 11 MG/DL (ref 6–23)
CALCIUM SERPL-MCNC: 8.9 MG/DL (ref 8.3–10.4)
CHLORIDE SERPL-SCNC: 106 MMOL/L (ref 98–107)
CK SERPL-CCNC: 65 U/L (ref 21–215)
CO2 SERPL-SCNC: 25 MMOL/L (ref 21–32)
CREAT SERPL-MCNC: 0.6 MG/DL (ref 0.6–1)
DIFFERENTIAL METHOD BLD: ABNORMAL
EOSINOPHIL # BLD: 0.2 K/UL (ref 0–0.8)
EOSINOPHIL NFR BLD: 2 % (ref 0.5–7.8)
ERYTHROCYTE [DISTWIDTH] IN BLOOD BY AUTOMATED COUNT: 13.2 % (ref 11.9–14.6)
ERYTHROCYTE [SEDIMENTATION RATE] IN BLOOD: 19 MM/HR (ref 0–30)
GLOBULIN SER CALC-MCNC: 3.2 G/DL (ref 2.3–3.5)
GLUCOSE SERPL-MCNC: 87 MG/DL (ref 65–100)
HAV IGM SER QL: NONREACTIVE
HBV CORE IGM SER QL: NONREACTIVE
HBV SURFACE AG SER QL: NONREACTIVE
HCT VFR BLD AUTO: 43 % (ref 35.8–46.3)
HCV AB SER QL: NONREACTIVE
HGB BLD-MCNC: 14 G/DL (ref 11.7–15.4)
IMM GRANULOCYTES # BLD AUTO: 0 K/UL (ref 0–0.5)
IMM GRANULOCYTES NFR BLD AUTO: 0 % (ref 0–5)
LYMPHOCYTES # BLD: 2.6 K/UL (ref 0.5–4.6)
LYMPHOCYTES NFR BLD: 23 % (ref 13–44)
MCH RBC QN AUTO: 31.9 PG (ref 26.1–32.9)
MCHC RBC AUTO-ENTMCNC: 32.6 G/DL (ref 31.4–35)
MCV RBC AUTO: 97.9 FL (ref 79.6–97.8)
MONOCYTES # BLD: 0.6 K/UL (ref 0.1–1.3)
MONOCYTES NFR BLD: 5 % (ref 4–12)
NEUTS SEG # BLD: 7.9 K/UL (ref 1.7–8.2)
NEUTS SEG NFR BLD: 70 % (ref 43–78)
NRBC # BLD: 0 K/UL (ref 0–0.2)
PLATELET # BLD AUTO: 224 K/UL (ref 150–450)
PMV BLD AUTO: 12.3 FL (ref 9.4–12.3)
POTASSIUM SERPL-SCNC: 4.6 MMOL/L (ref 3.5–5.1)
PROT SERPL-MCNC: 7.1 G/DL (ref 6.3–8.2)
RBC # BLD AUTO: 4.39 M/UL (ref 4.05–5.2)
SODIUM SERPL-SCNC: 140 MMOL/L (ref 136–145)
WBC # BLD AUTO: 11.4 K/UL (ref 4.3–11.1)

## 2022-07-27 ENCOUNTER — HOSPITAL ENCOUNTER (OUTPATIENT)
Dept: GENERAL RADIOLOGY | Age: 52
Discharge: HOME | End: 2022-07-30
Payer: COMMERCIAL

## 2022-07-27 DIAGNOSIS — R79.82 ELEVATED C-REACTIVE PROTEIN (CRP): ICD-10-CM

## 2022-07-27 DIAGNOSIS — M47.816 FACET ARTHROPATHY, LUMBAR: ICD-10-CM

## 2022-07-27 DIAGNOSIS — R29.898 OTHER SYMPTOMS AND SIGNS INVOLVING THE MUSCULOSKELETAL SYSTEM: ICD-10-CM

## 2022-07-27 DIAGNOSIS — M54.9 DORSALGIA: ICD-10-CM

## 2022-07-27 LAB — RHEUMATOID FACT SER QL LA: NEGATIVE

## 2022-07-27 PROCEDURE — 72100 X-RAY EXAM L-S SPINE 2/3 VWS: CPT

## 2022-07-27 PROCEDURE — 73130 X-RAY EXAM OF HAND: CPT

## 2022-07-27 PROCEDURE — 73630 X-RAY EXAM OF FOOT: CPT

## 2022-07-27 PROCEDURE — 72202 X-RAY EXAM SI JOINTS 3/> VWS: CPT

## 2022-07-28 LAB — CCP IGA+IGG SERPL IA-ACNC: 5 UNITS (ref 0–19)

## 2022-07-29 LAB — CRP SERPL-MCNC: 1.1 MG/DL (ref 0–0.9)

## 2022-08-03 LAB — 14-3-3 ETA AG SER IA-MCNC: <0.2 NG/ML

## 2022-08-16 LAB
ALBUMIN SERPL-MCNC: 4 G/DL (ref 3.5–5)
ALBUMIN/GLOB SERPL: 1.1 {RATIO} (ref 1.2–3.5)
ALP SERPL-CCNC: 119 U/L (ref 50–136)
ALT SERPL-CCNC: 56 U/L (ref 12–65)
AST SERPL-CCNC: 32 U/L (ref 15–37)
BILIRUB DIRECT SERPL-MCNC: 0.2 MG/DL
BILIRUB SERPL-MCNC: 0.6 MG/DL (ref 0.2–1.1)
CHOLEST SERPL-MCNC: 209 MG/DL
GLOBULIN SER CALC-MCNC: 3.6 G/DL (ref 2.3–3.5)
HDLC SERPL-MCNC: 49 MG/DL (ref 40–60)
HDLC SERPL: 4.3 {RATIO}
LDLC SERPL CALC-MCNC: 127.6 MG/DL
PROT SERPL-MCNC: 7.6 G/DL (ref 6.3–8.2)
TRIGL SERPL-MCNC: 162 MG/DL (ref 35–150)
VLDLC SERPL CALC-MCNC: 32.4 MG/DL (ref 6–23)

## 2022-08-18 ENCOUNTER — TELEPHONE (OUTPATIENT)
Dept: FAMILY MEDICINE CLINIC | Facility: CLINIC | Age: 52
End: 2022-08-18

## 2022-08-18 DIAGNOSIS — E78.00 HIGH CHOLESTEROL: ICD-10-CM

## 2022-08-18 NOTE — TELEPHONE ENCOUNTER
----- Message from SHAYY Vines NP sent at 8/17/2022  8:00 AM EDT -----  LDL much improved down from 196 to 127 .  Continue your Lipitor diet and exercise and can see if other measures are needed after next apt

## 2022-08-19 NOTE — TELEPHONE ENCOUNTER
Derick Calvillo  You 20 hours ago (6:03 PM)           Sunday Gerry thanks, will you please send in a new prescription for my cholesterol medication to the [facility].

## 2022-08-22 RX ORDER — ATORVASTATIN CALCIUM 20 MG/1
20 TABLET, FILM COATED ORAL DAILY
Qty: 90 TABLET | Refills: 1 | Status: SHIPPED | OUTPATIENT
Start: 2022-08-22

## 2022-08-31 NOTE — PROGRESS NOTES
comments:     Quit smoking: started age 10. Substance Use Topics    Alcohol use: Yes     Comment: occ       Social History     Substance and Sexual Activity   Sexual Activity Yes    Birth control/protection: None     OB History    Para Term  AB Living   3 2 0 0 1 0   SAB IAB Ectopic Molar Multiple Live Births   0 0 0 0 0 0      # Outcome Date GA Lbr Prince/2nd Weight Sex Delivery Anes PTL Lv   3 AB            2 Para            1 Para               Obstetric Comments   Vaginal       Health Maintenance  Mammogram: 20 Deann  Colonoscopy: 2019 Repeat 5 years  Bone Density:  Pap smear: 3-24-21      Review of Systems  General: Not Present- Chills, Fever, Fatigue, Insomnia, Hot flashes/Night sweats, Weight gain  Skin: Not Present- Bruising, Change in Wart/Mole, Excessive Sweating, Itching, Nail Changes, New Lesions, Rash, Skin Color Changes and Ulcer. HEENT: Not Present- Headache, Blurred Vision, Double Vision, Glaucoma, Visual Disturbances, Hearing Loss, Ringing in the Ears, Vertigo, Nose Bleed, Bleeding Gums, Hoarseness and Sore Throat. Neck: Not Present- Neck Pain and Neck Swelling. Respiratory: Not Present- Cough, Difficulty Breathing and Difficulty Breathing on Exertion. Breast: Not Present- Breast Mass, Breast Pain, Breast Swelling, Nipple Discharge, Nipple Pain, Recent Breast Size Changes and Skin Changes. Cardiovascular: Not Present- Abnormal Blood Pressure, Chest Pain, Edema, Fainting / Blacking Out, Palpitations, Shortness of Breath and Swelling of Extremities. Gastrointestinal: Not Present- Abdominal Pain, Abdominal Swelling, Bloating, Change in Bowel Habits, Constipation, Diarrhea, Difficulty Swallowing, Gets full quickly at meals, Nausea, Rectal Bleeding and Vomiting.   Female Genitourinary: Not Present- Dysmenorrhea, Dyspareunia, Decreased libido, Excessive Menstrual Bleeding, Menstrual Irregularities, Pelvic Pain, Urinary Complaints, Vaginal Discharge, Vaginal itching/burning, Vaginal odor  Musculoskeletal: Not Present- Joint Pain and Muscle Pain. Neurological: Not Present- Dizziness, Fainting, Headaches and Seizures. Psychiatric: Not Present- Anxiety, Depression, Mood changes and Panic Attacks. Endocrine: Not Present- Appetite Changes, Cold Intolerance, Excessive Thirst, Excessive Urination and Heat Intolerance. Hematology: Not Present- Abnormal Bleeding, Easy Bruising and Enlarged Lymph Nodes. PHYSICAL EXAM:     /80   Ht 5' 2\" (1.575 m)   Wt 192 lb (87.1 kg)   LMP 01/03/2022   BMI 35.12 kg/m²     Physical Exam   General   Mental Status - Alert. General Appearance - Cooperative. Integumentary   General Characteristics: Overall examination of the patient's skin reveals - no rashes and no suspicious lesions. Head and Neck  Head - normocephalic, atraumatic with no lesions or palpable masses. Neck Note: Normal   Thyroid   Gland Characteristics - normal size and consistency and no palpable nodules. Chest and Lung Exam   Chest and lung exam reveals - on auscultation, normal breath sounds, no adventitious sounds and normal vocal resonance. Breast   Breast - Left - Normal. Right - Normal.     Cardiovascular   Cardiovascular examination reveals - normal heart sounds, regular rate and rhythm with no murmurs. Abdomen   Inspection: - Inspection Normal.   Palpation/Percussion: Palpation and Percussion of the abdomen reveal - Non Tender, No Rebound tenderness, No Rigidity (guarding), No hepatosplenomegaly, No Palpable abdominal masses and Soft. Auscultation: Auscultation of the abdomen reveals - Bowel sounds normal.     Female Genitourinary     External Genitalia   Vulva: - Normal. Perineum - Normal. Bartholin's Gland - Bilateral - Normal. Clitoris - Normal.   Introitus: Characteristics - Normal.   Urethra: Characteristics - Normal.     Speculum & Bimanual   Vagina: Vaginal Mucosa - Normal.   Vaginal Wall: - Normal.   Vaginal Lesions - None.    Cervix: Characteristics - Normal.   Uterus: Characteristics - Normal.   Adnexa: - Normal.   Bladder - Normal.     Peripheral Vascular   Normal    Neuropsychiatric   Examination of related systems reveals - The patient is well-nourished and well-groomed. Mental status exam performed with findings of - Oriented X3 with appropriate mood and affect. Musculoskeletal  Normal      General Lymphatics  Normal           Medical problems and test results were reviewed with the patient today. ASSESSMENT and PLAN    1. Well woman exam  2. Screening for malignant neoplasm of cervix  -     PAP LB, Reflex HPV ASCUS         No follow-ups on file.        Jj Carrizales MD  9/1/2022

## 2022-09-01 ENCOUNTER — OFFICE VISIT (OUTPATIENT)
Dept: GYNECOLOGY | Age: 52
End: 2022-09-01
Payer: COMMERCIAL

## 2022-09-01 VITALS
BODY MASS INDEX: 35.33 KG/M2 | DIASTOLIC BLOOD PRESSURE: 80 MMHG | HEIGHT: 62 IN | WEIGHT: 192 LBS | SYSTOLIC BLOOD PRESSURE: 110 MMHG

## 2022-09-01 DIAGNOSIS — Z12.4 SCREENING FOR MALIGNANT NEOPLASM OF CERVIX: ICD-10-CM

## 2022-09-01 DIAGNOSIS — Z01.419 WELL WOMAN EXAM: Primary | ICD-10-CM

## 2022-09-01 PROCEDURE — 99396 PREV VISIT EST AGE 40-64: CPT | Performed by: OBSTETRICS & GYNECOLOGY

## 2022-09-01 ASSESSMENT — PATIENT HEALTH QUESTIONNAIRE - PHQ9
SUM OF ALL RESPONSES TO PHQ QUESTIONS 1-9: 0
SUM OF ALL RESPONSES TO PHQ9 QUESTIONS 1 & 2: 0
SUM OF ALL RESPONSES TO PHQ QUESTIONS 1-9: 0
2. FEELING DOWN, DEPRESSED OR HOPELESS: 0
SUM OF ALL RESPONSES TO PHQ QUESTIONS 1-9: 0
SUM OF ALL RESPONSES TO PHQ QUESTIONS 1-9: 0
1. LITTLE INTEREST OR PLEASURE IN DOING THINGS: 0

## 2022-09-06 LAB
CYTOLOGIST CVX/VAG CYTO: NORMAL
CYTOLOGY CVX/VAG DOC THIN PREP: NORMAL
HPV REFLEX: NORMAL
Lab: NORMAL
PATH REPORT.FINAL DX SPEC: NORMAL
STAT OF ADQ CVX/VAG CYTO-IMP: NORMAL

## 2022-12-29 ENCOUNTER — OFFICE VISIT (OUTPATIENT)
Dept: FAMILY MEDICINE CLINIC | Facility: CLINIC | Age: 52
End: 2022-12-29
Payer: COMMERCIAL

## 2022-12-29 ENCOUNTER — HOSPITAL ENCOUNTER (OUTPATIENT)
Dept: GENERAL RADIOLOGY | Age: 52
Discharge: HOME | End: 2022-12-29
Payer: COMMERCIAL

## 2022-12-29 VITALS
DIASTOLIC BLOOD PRESSURE: 73 MMHG | HEIGHT: 62 IN | OXYGEN SATURATION: 97 % | SYSTOLIC BLOOD PRESSURE: 130 MMHG | HEART RATE: 84 BPM | WEIGHT: 181 LBS | RESPIRATION RATE: 19 BRPM | BODY MASS INDEX: 33.31 KG/M2 | TEMPERATURE: 96.7 F

## 2022-12-29 DIAGNOSIS — R07.81 RIB PAIN ON LEFT SIDE: Primary | ICD-10-CM

## 2022-12-29 DIAGNOSIS — R07.81 RIB PAIN ON LEFT SIDE: ICD-10-CM

## 2022-12-29 PROCEDURE — 99214 OFFICE O/P EST MOD 30 MIN: CPT | Performed by: FAMILY MEDICINE

## 2022-12-29 PROCEDURE — 71101 X-RAY EXAM UNILAT RIBS/CHEST: CPT

## 2022-12-29 ASSESSMENT — ENCOUNTER SYMPTOMS
SHORTNESS OF BREATH: 0
WHEEZING: 0
CHEST TIGHTNESS: 0
ABDOMINAL DISTENTION: 0
NAUSEA: 0
ABDOMINAL PAIN: 0
BLOOD IN STOOL: 0
DIARRHEA: 0
STRIDOR: 0
VOMITING: 0

## 2022-12-29 ASSESSMENT — PATIENT HEALTH QUESTIONNAIRE - PHQ9
SUM OF ALL RESPONSES TO PHQ QUESTIONS 1-9: 0
2. FEELING DOWN, DEPRESSED OR HOPELESS: 0
SUM OF ALL RESPONSES TO PHQ QUESTIONS 1-9: 0
1. LITTLE INTEREST OR PLEASURE IN DOING THINGS: 0
SUM OF ALL RESPONSES TO PHQ QUESTIONS 1-9: 0
SUM OF ALL RESPONSES TO PHQ9 QUESTIONS 1 & 2: 0
SUM OF ALL RESPONSES TO PHQ QUESTIONS 1-9: 0

## 2022-12-29 NOTE — PROGRESS NOTES
Mateo Aldana (:  1970) is a 45 y.o. female,Established patient, here for evaluation of the following chief complaint(s):  Abdominal Pain (Under the left breast, had a fall Sat. 22. Has no blood in the stool) and Other (Has been over 10 years since the last Tdap and Pneumonia shots.)         ASSESSMENT/PLAN:  1. Rib pain on left side  -     XR RIBS LEFT INCLUDE CHEST (MIN 3 VIEWS); Future  Explained xray is unlikely to change the treatment plan, but it may help to inform her regarding expectations of when things may resolve. No follow-ups on file. Subjective   SUBJECTIVE/OBJECTIVE:  HPI  Chief Complaint   Patient presents with    Abdominal Pain     Under the left breast, had a fall Sat. 22. Has no blood in the stool    Other     Has been over 10 years since the last Tdap and Pneumonia shots. Alfie Centeno two weeks ago and went to catch herself on a table. Her hand knocked over the lamp and her left anterior rib cage fell against the edge of the table. She felt immediate pain. Did not pass out. Also had some bruising on her right hip as well. No pain in that area, however. She says she hasn't seen any bruising over the rib cage. Review of Systems   Constitutional:  Negative for fatigue and fever. Respiratory:  Negative for chest tightness, shortness of breath, wheezing and stridor. Cardiovascular:  Negative for leg swelling. Gastrointestinal:  Negative for abdominal distention, abdominal pain, blood in stool, diarrhea, nausea and vomiting. Genitourinary:  Negative for hematuria. Objective   Physical Exam  Constitutional:       General: She is not in acute distress. Appearance: Normal appearance. She is obese. Cardiovascular:      Rate and Rhythm: Normal rate and regular rhythm. Pulmonary:      Effort: Pulmonary effort is normal.      Breath sounds: Normal breath sounds. Chest:      Chest wall: Tenderness present.  No mass, deformity, swelling or crepitus. Comments: Area of capillary prominence, may be healing bruise. Tender to touch over last anterior rib. Pain reported in same area w/ movement, cough, or sneezing. Neurological:      Mental Status: She is alert. An electronic signature was used to authenticate this note.     --Chi Krueger MD

## 2023-04-20 ENCOUNTER — TELEPHONE (OUTPATIENT)
Dept: FAMILY MEDICINE CLINIC | Facility: CLINIC | Age: 53
End: 2023-04-20

## 2023-04-20 NOTE — TELEPHONE ENCOUNTER
[facility] sent  message that patient is requesting to book physical     Office called [facility]vm for pt to cb and schedule appointment

## 2023-05-09 ENCOUNTER — OFFICE VISIT (OUTPATIENT)
Dept: FAMILY MEDICINE CLINIC | Facility: CLINIC | Age: 53
End: 2023-05-09
Payer: COMMERCIAL

## 2023-05-09 VITALS
BODY MASS INDEX: 31.65 KG/M2 | SYSTOLIC BLOOD PRESSURE: 119 MMHG | DIASTOLIC BLOOD PRESSURE: 76 MMHG | HEART RATE: 74 BPM | HEIGHT: 62 IN | WEIGHT: 172 LBS | TEMPERATURE: 97.8 F

## 2023-05-09 DIAGNOSIS — M47.816 FACET ARTHROPATHY, LUMBAR: ICD-10-CM

## 2023-05-09 DIAGNOSIS — E78.00 PURE HYPERCHOLESTEROLEMIA, UNSPECIFIED: ICD-10-CM

## 2023-05-09 DIAGNOSIS — Z00.00 ANNUAL PHYSICAL EXAM: Primary | ICD-10-CM

## 2023-05-09 DIAGNOSIS — J30.1 SEASONAL ALLERGIC RHINITIS DUE TO POLLEN: ICD-10-CM

## 2023-05-09 DIAGNOSIS — Z00.00 ANNUAL PHYSICAL EXAM: ICD-10-CM

## 2023-05-09 PROBLEM — C44.311 BASAL CELL CARCINOMA OF NOSE: Status: ACTIVE | Noted: 2023-05-09

## 2023-05-09 PROCEDURE — 99396 PREV VISIT EST AGE 40-64: CPT | Performed by: NURSE PRACTITIONER

## 2023-05-09 SDOH — ECONOMIC STABILITY: FOOD INSECURITY: WITHIN THE PAST 12 MONTHS, YOU WORRIED THAT YOUR FOOD WOULD RUN OUT BEFORE YOU GOT MONEY TO BUY MORE.: NEVER TRUE

## 2023-05-09 SDOH — ECONOMIC STABILITY: HOUSING INSECURITY
IN THE LAST 12 MONTHS, WAS THERE A TIME WHEN YOU DID NOT HAVE A STEADY PLACE TO SLEEP OR SLEPT IN A SHELTER (INCLUDING NOW)?: NO

## 2023-05-09 SDOH — ECONOMIC STABILITY: INCOME INSECURITY: HOW HARD IS IT FOR YOU TO PAY FOR THE VERY BASICS LIKE FOOD, HOUSING, MEDICAL CARE, AND HEATING?: NOT HARD AT ALL

## 2023-05-09 SDOH — HEALTH STABILITY: PHYSICAL HEALTH: ON AVERAGE, HOW MANY MINUTES DO YOU ENGAGE IN EXERCISE AT THIS LEVEL?: 0 MIN

## 2023-05-09 SDOH — HEALTH STABILITY: PHYSICAL HEALTH: ON AVERAGE, HOW MANY DAYS PER WEEK DO YOU ENGAGE IN MODERATE TO STRENUOUS EXERCISE (LIKE A BRISK WALK)?: 0 DAYS

## 2023-05-09 SDOH — ECONOMIC STABILITY: FOOD INSECURITY: WITHIN THE PAST 12 MONTHS, THE FOOD YOU BOUGHT JUST DIDN'T LAST AND YOU DIDN'T HAVE MONEY TO GET MORE.: NEVER TRUE

## 2023-05-09 ASSESSMENT — SOCIAL DETERMINANTS OF HEALTH (SDOH)
WITHIN THE LAST YEAR, HAVE YOU BEEN KICKED, HIT, SLAPPED, OR OTHERWISE PHYSICALLY HURT BY YOUR PARTNER OR EX-PARTNER?: NO
HOW OFTEN DO YOU ATTENT MEETINGS OF THE CLUB OR ORGANIZATION YOU BELONG TO?: NEVER
WITHIN THE LAST YEAR, HAVE YOU BEEN HUMILIATED OR EMOTIONALLY ABUSED IN OTHER WAYS BY YOUR PARTNER OR EX-PARTNER?: NO
HOW OFTEN DO YOU ATTEND CHURCH OR RELIGIOUS SERVICES?: NEVER
WITHIN THE LAST YEAR, HAVE YOU BEEN AFRAID OF YOUR PARTNER OR EX-PARTNER?: NO
HOW OFTEN DO YOU GET TOGETHER WITH FRIENDS OR RELATIVES?: MORE THAN THREE TIMES A WEEK
DO YOU BELONG TO ANY CLUBS OR ORGANIZATIONS SUCH AS CHURCH GROUPS UNIONS, FRATERNAL OR ATHLETIC GROUPS, OR SCHOOL GROUPS?: NO
WITHIN THE LAST YEAR, HAVE TO BEEN RAPED OR FORCED TO HAVE ANY KIND OF SEXUAL ACTIVITY BY YOUR PARTNER OR EX-PARTNER?: NO
IN A TYPICAL WEEK, HOW MANY TIMES DO YOU TALK ON THE PHONE WITH FAMILY, FRIENDS, OR NEIGHBORS?: MORE THAN THREE TIMES A WEEK

## 2023-05-09 ASSESSMENT — ENCOUNTER SYMPTOMS
ANAL BLEEDING: 0
EYE ITCHING: 1
BACK PAIN: 1
VOMITING: 0
PHOTOPHOBIA: 0
WHEEZING: 0
EYE REDNESS: 0
SHORTNESS OF BREATH: 0
NAUSEA: 0
COUGH: 0
RECTAL PAIN: 0
VOICE CHANGE: 0
BLOOD IN STOOL: 0
CHEST TIGHTNESS: 0
CONSTIPATION: 0
CHOKING: 0
SORE THROAT: 0
TROUBLE SWALLOWING: 0
DIARRHEA: 0
COLOR CHANGE: 0
ABDOMINAL DISTENTION: 0
ABDOMINAL PAIN: 0
SINUS PAIN: 1
SINUS PRESSURE: 0

## 2023-05-09 ASSESSMENT — PATIENT HEALTH QUESTIONNAIRE - PHQ9
SUM OF ALL RESPONSES TO PHQ9 QUESTIONS 1 & 2: 0
SUM OF ALL RESPONSES TO PHQ QUESTIONS 1-9: 0
2. FEELING DOWN, DEPRESSED OR HOPELESS: 0
1. LITTLE INTEREST OR PLEASURE IN DOING THINGS: 0
SUM OF ALL RESPONSES TO PHQ QUESTIONS 1-9: 0

## 2023-05-09 ASSESSMENT — LIFESTYLE VARIABLES
HOW MANY STANDARD DRINKS CONTAINING ALCOHOL DO YOU HAVE ON A TYPICAL DAY: 1 OR 2
HOW OFTEN DO YOU HAVE A DRINK CONTAINING ALCOHOL: 4 OR MORE TIMES A WEEK

## 2023-05-09 NOTE — PROGRESS NOTES
Subjective:      Patient ID: Trisha Salazar is a 45 y.o. female. Here for CPX  Has up to date eye exam no up to date dental apt. She wears her seat belt routinely  Eats a somewhat healthy diet   Is not active  She has had COVID vaccines and booster x 1. She wears sun screen with sun exposure or protective clothing. Wants crp drawn to check on her inflammatory markers as has so much lower extremity pain states saw a specialist who found no cause for  her pain in the recent past    HPI    Review of Systems   Constitutional:  Negative for activity change, chills, diaphoresis, fatigue, fever and unexpected weight change. HENT:  Positive for congestion and sinus pain. Negative for ear pain, hearing loss, mouth sores, nosebleeds, sinus pressure, sore throat, trouble swallowing and voice change. Eyes:  Positive for itching. Negative for photophobia and redness. Wears glasses   Respiratory:  Negative for cough, choking, chest tightness, shortness of breath and wheezing. Cardiovascular:  Positive for palpitations (with stress). Negative for chest pain and leg swelling. Gastrointestinal:  Negative for abdominal distention, abdominal pain, anal bleeding, blood in stool, constipation, diarrhea, nausea, rectal pain and vomiting. Endocrine: Positive for heat intolerance. Negative for cold intolerance, polydipsia and polyuria. Genitourinary:  Negative for difficulty urinating, dysuria, flank pain, frequency and hematuria. No bleeding   Musculoskeletal:  Positive for arthralgias and back pain (with long days in a row on her feet). Negative for neck pain. All over joint pain in hips and knees and feet   Skin:  Negative for color change, pallor and rash. Allergic/Immunologic: Positive for environmental allergies. Negative for food allergies and immunocompromised state. Neurological:  Negative for dizziness, tremors, seizures, weakness, numbness and headaches.    Hematological:

## 2023-05-10 ENCOUNTER — TELEPHONE (OUTPATIENT)
Dept: FAMILY MEDICINE CLINIC | Facility: CLINIC | Age: 53
End: 2023-05-10

## 2023-05-10 DIAGNOSIS — E78.00 PURE HYPERCHOLESTEROLEMIA, UNSPECIFIED: Primary | ICD-10-CM

## 2023-05-10 LAB
ALBUMIN SERPL-MCNC: 3.9 G/DL (ref 3.5–5)
ALBUMIN/GLOB SERPL: 1 (ref 0.4–1.6)
ALP SERPL-CCNC: 94 U/L (ref 50–136)
ALT SERPL-CCNC: 22 U/L (ref 12–65)
ANION GAP SERPL CALC-SCNC: 5 MMOL/L (ref 2–11)
AST SERPL-CCNC: 14 U/L (ref 15–37)
BILIRUB SERPL-MCNC: 0.6 MG/DL (ref 0.2–1.1)
BUN SERPL-MCNC: 16 MG/DL (ref 6–23)
CALCIUM SERPL-MCNC: 9.4 MG/DL (ref 8.3–10.4)
CHLORIDE SERPL-SCNC: 104 MMOL/L (ref 101–110)
CHOLEST SERPL-MCNC: 276 MG/DL
CO2 SERPL-SCNC: 27 MMOL/L (ref 21–32)
CREAT SERPL-MCNC: 0.6 MG/DL (ref 0.6–1)
CRP SERPL-MCNC: 1.3 MG/DL (ref 0–0.9)
GLOBULIN SER CALC-MCNC: 3.8 G/DL (ref 2.8–4.5)
GLUCOSE SERPL-MCNC: 94 MG/DL (ref 65–100)
HDLC SERPL-MCNC: 60 MG/DL (ref 40–60)
HDLC SERPL: 4.6
LDLC SERPL CALC-MCNC: 186.4 MG/DL
POTASSIUM SERPL-SCNC: 4.6 MMOL/L (ref 3.5–5.1)
PROT SERPL-MCNC: 7.7 G/DL (ref 6.3–8.2)
SODIUM SERPL-SCNC: 136 MMOL/L (ref 133–143)
TRIGL SERPL-MCNC: 148 MG/DL (ref 35–150)
TSH, 3RD GENERATION: 1.27 UIU/ML (ref 0.36–3.74)
VLDLC SERPL CALC-MCNC: 29.6 MG/DL (ref 6–23)

## 2023-05-10 NOTE — TELEPHONE ENCOUNTER
----- Message from SHAYY Harman NP sent at 5/10/2023 11:16 AM EDT -----  CRP only minimally elevated TSH is normal Cholesterol is terrible Are you taking the Lipitor or not?

## 2023-05-10 NOTE — TELEPHONE ENCOUNTER
----- Message from SHAYY Austin NP sent at 5/10/2023 11:18 AM EDT -----  If not you need to start back do you need a new rx?

## 2023-05-11 RX ORDER — ATORVASTATIN CALCIUM 20 MG/1
20 TABLET, FILM COATED ORAL DAILY
Qty: 30 TABLET | Refills: 3 | Status: SHIPPED | OUTPATIENT
Start: 2023-05-11

## 2023-05-11 NOTE — TELEPHONE ENCOUNTER
[address] [place] [address] Clinical Staff (supporting Ana , [address] M[address]) 2 hours ago (7:52 AM)       Yes please, I do need a new prescription, thank you.

## 2023-11-08 DIAGNOSIS — E78.00 PURE HYPERCHOLESTEROLEMIA, UNSPECIFIED: ICD-10-CM

## 2023-11-08 RX ORDER — ATORVASTATIN CALCIUM 20 MG/1
20 TABLET, FILM COATED ORAL DAILY
Qty: 30 TABLET | Refills: 2 | Status: SHIPPED | OUTPATIENT
Start: 2023-11-08

## 2023-11-08 NOTE — TELEPHONE ENCOUNTER
Pt called and said she just ran out of her cholesterol medicine. I told her when you checked her cholesterol in May it was very high so you sent in 3 months worth. She was due to come back to have it checked but didn't do that. 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 She wanted to know if she can get 2 months worth until Jan. Or she said she can come in early to get the lipid panel done one morning if she has to.

## 2023-12-02 DIAGNOSIS — E78.00 PURE HYPERCHOLESTEROLEMIA, UNSPECIFIED: ICD-10-CM

## 2023-12-04 RX ORDER — ATORVASTATIN CALCIUM 20 MG/1
20 TABLET, FILM COATED ORAL DAILY
Qty: 30 TABLET | Refills: 2 | OUTPATIENT
Start: 2023-12-04

## 2024-03-12 ASSESSMENT — PATIENT HEALTH QUESTIONNAIRE - PHQ9
2. FEELING DOWN, DEPRESSED OR HOPELESS: SEVERAL DAYS
1. LITTLE INTEREST OR PLEASURE IN DOING THINGS: SEVERAL DAYS
SUM OF ALL RESPONSES TO PHQ QUESTIONS 1-9: 2
SUM OF ALL RESPONSES TO PHQ9 QUESTIONS 1 & 2: 2
SUM OF ALL RESPONSES TO PHQ QUESTIONS 1-9: 2
1. LITTLE INTEREST OR PLEASURE IN DOING THINGS: 1
2. FEELING DOWN, DEPRESSED OR HOPELESS: 1
SUM OF ALL RESPONSES TO PHQ9 QUESTIONS 1 & 2: 2

## 2024-03-14 ENCOUNTER — OFFICE VISIT (OUTPATIENT)
Dept: FAMILY MEDICINE CLINIC | Facility: CLINIC | Age: 54
End: 2024-03-14

## 2024-03-14 VITALS
RESPIRATION RATE: 19 BRPM | BODY MASS INDEX: 33.68 KG/M2 | HEIGHT: 62 IN | DIASTOLIC BLOOD PRESSURE: 84 MMHG | HEART RATE: 74 BPM | SYSTOLIC BLOOD PRESSURE: 134 MMHG | WEIGHT: 183 LBS

## 2024-03-14 DIAGNOSIS — E78.00 PURE HYPERCHOLESTEROLEMIA, UNSPECIFIED: ICD-10-CM

## 2024-03-14 DIAGNOSIS — Z87.891 PERSONAL HISTORY OF TOBACCO USE: Primary | ICD-10-CM

## 2024-03-14 DIAGNOSIS — G89.29 CHRONIC LEFT SHOULDER PAIN: ICD-10-CM

## 2024-03-14 DIAGNOSIS — M25.512 CHRONIC LEFT SHOULDER PAIN: ICD-10-CM

## 2024-03-14 DIAGNOSIS — F17.211 CIGARETTE NICOTINE DEPENDENCE IN REMISSION: ICD-10-CM

## 2024-03-14 DIAGNOSIS — F17.210 CIGARETTE NICOTINE DEPENDENCE, UNCOMPLICATED: ICD-10-CM

## 2024-03-14 LAB
ALBUMIN SERPL-MCNC: 3.7 G/DL (ref 3.5–5)
ALBUMIN/GLOB SERPL: 1 (ref 0.4–1.6)
ALP SERPL-CCNC: 92 U/L (ref 50–136)
ALT SERPL-CCNC: 24 U/L (ref 12–65)
ANION GAP SERPL CALC-SCNC: 5 MMOL/L (ref 2–11)
AST SERPL-CCNC: 13 U/L (ref 15–37)
BILIRUB SERPL-MCNC: 0.4 MG/DL (ref 0.2–1.1)
BUN SERPL-MCNC: 15 MG/DL (ref 6–23)
CALCIUM SERPL-MCNC: 9.2 MG/DL (ref 8.3–10.4)
CHLORIDE SERPL-SCNC: 107 MMOL/L (ref 103–113)
CHOLEST SERPL-MCNC: 170 MG/DL
CO2 SERPL-SCNC: 30 MMOL/L (ref 21–32)
CREAT SERPL-MCNC: 0.6 MG/DL (ref 0.6–1)
GLOBULIN SER CALC-MCNC: 3.6 G/DL (ref 2.8–4.5)
GLUCOSE SERPL-MCNC: 105 MG/DL (ref 65–100)
HDLC SERPL-MCNC: 59 MG/DL (ref 40–60)
HDLC SERPL: 2.9
LDLC SERPL CALC-MCNC: 75.6 MG/DL
POTASSIUM SERPL-SCNC: 3.9 MMOL/L (ref 3.5–5.1)
PROT SERPL-MCNC: 7.3 G/DL (ref 6.3–8.2)
SODIUM SERPL-SCNC: 142 MMOL/L (ref 136–146)
TRIGL SERPL-MCNC: 177 MG/DL (ref 35–150)
VLDLC SERPL CALC-MCNC: 35.4 MG/DL (ref 6–23)

## 2024-03-14 RX ORDER — ATORVASTATIN CALCIUM 20 MG/1
20 TABLET, FILM COATED ORAL DAILY
Qty: 90 TABLET | Refills: 1 | Status: SHIPPED | OUTPATIENT
Start: 2024-03-14

## 2024-03-14 RX ORDER — NAPROXEN 500 MG/1
500 TABLET ORAL 2 TIMES DAILY WITH MEALS
Qty: 60 TABLET | Refills: 3 | Status: SHIPPED | OUTPATIENT
Start: 2024-03-14

## 2024-03-14 NOTE — PROGRESS NOTES
cessation.    Also reviewed the following if the patient has Medicare that as of February 10, 2022, Medicare only covers LDCT screening in patients aged 50-77 with at least a 20 pack-year smoking history who currently smoke or have quit in the last 15 years

## 2024-03-15 ENCOUNTER — TELEPHONE (OUTPATIENT)
Dept: FAMILY MEDICINE CLINIC | Facility: CLINIC | Age: 54
End: 2024-03-15

## 2024-03-15 NOTE — TELEPHONE ENCOUNTER
I sent a [employer] message about her lab results. She will need to call the front office back to schedule her 6 month follow up appointment  for her med refills along with labs the week of 9/9/24 since 9/14 falls on a Sunday.

## 2024-03-18 DIAGNOSIS — M25.512 CHRONIC LEFT SHOULDER PAIN: ICD-10-CM

## 2024-03-18 DIAGNOSIS — G89.29 CHRONIC LEFT SHOULDER PAIN: ICD-10-CM

## 2024-03-18 NOTE — PROGRESS NOTES
Swelling.  Respiratory: Not Present- Cough, Difficulty Breathing and Difficulty Breathing on Exertion.  Breast: Not Present- Breast Mass, Breast Pain, Breast Swelling, Nipple Discharge, Nipple Pain, Recent Breast Size Changes and Skin Changes.  Cardiovascular: Not Present- Abnormal Blood Pressure, Chest Pain, Edema, Fainting / Blacking Out, Palpitations, Shortness of Breath and Swelling of Extremities.  Gastrointestinal: Not Present- Abdominal Pain, Abdominal Swelling, Bloating, Change in Bowel Habits, Constipation, Diarrhea, Difficulty Swallowing, Gets full quickly at meals, Nausea, Rectal Bleeding and Vomiting.  Female Genitourinary: Not Present- Dysmenorrhea, Dyspareunia, Decreased libido, Excessive Menstrual Bleeding, Menstrual Irregularities, Pelvic Pain, Urinary Complaints, Vaginal Discharge, Vaginal itching/burning, Vaginal odor  Musculoskeletal: Not Present- Joint Pain and Muscle Pain.  Neurological: Not Present- Dizziness, Fainting, Headaches and Seizures.  Psychiatric: Not Present- Anxiety, Depression, Mood changes and Panic Attacks.  Endocrine: Not Present- Appetite Changes, Cold Intolerance, Excessive Thirst, Excessive Urination and Heat Intolerance.  Hematology: Not Present- Abnormal Bleeding, Easy Bruising and Enlarged Lymph Nodes.         PHYSICAL EXAM:     /76   Ht 1.575 m (5' 2\")   Wt 81.6 kg (180 lb)   LMP 10/04/2022   BMI 32.92 kg/m²     Physical Exam   General   Mental Status - Alert. General Appearance - Cooperative.     Integumentary   General Characteristics: Overall examination of the patient's skin reveals - no rashes and no suspicious lesions.     Head and Neck  Head - normocephalic, atraumatic with no lesions or palpable masses.   Neck Note: Normal   Thyroid   Gland Characteristics - normal size and consistency and no palpable nodules.     Chest and Lung Exam   Chest and lung exam reveals - on auscultation, normal breath sounds, no adventitious sounds and normal vocal resonance.

## 2024-03-19 ENCOUNTER — TELEPHONE (OUTPATIENT)
Dept: FAMILY MEDICINE CLINIC | Facility: CLINIC | Age: 54
End: 2024-03-19

## 2024-03-19 NOTE — TELEPHONE ENCOUNTER
----- Message from SHAYY Aguiar NP sent at 3/19/2024  7:36 AM EDT -----  Xray shows no issue with bones in shoulder if pain not improved with pt and naprosyn I need her to follow up and let me know.

## 2024-03-20 ENCOUNTER — OFFICE VISIT (OUTPATIENT)
Dept: OBGYN CLINIC | Age: 54
End: 2024-03-20
Payer: COMMERCIAL

## 2024-03-20 VITALS
HEIGHT: 62 IN | SYSTOLIC BLOOD PRESSURE: 110 MMHG | BODY MASS INDEX: 33.13 KG/M2 | WEIGHT: 180 LBS | DIASTOLIC BLOOD PRESSURE: 76 MMHG

## 2024-03-20 DIAGNOSIS — N95.2 VAGINAL ATROPHY: ICD-10-CM

## 2024-03-20 DIAGNOSIS — Z12.4 SCREENING FOR MALIGNANT NEOPLASM OF CERVIX: ICD-10-CM

## 2024-03-20 DIAGNOSIS — Z12.31 VISIT FOR SCREENING MAMMOGRAM: ICD-10-CM

## 2024-03-20 DIAGNOSIS — Z01.419 WELL WOMAN EXAM: Primary | ICD-10-CM

## 2024-03-20 PROCEDURE — 99459 PELVIC EXAMINATION: CPT | Performed by: OBSTETRICS & GYNECOLOGY

## 2024-03-20 PROCEDURE — 99396 PREV VISIT EST AGE 40-64: CPT | Performed by: OBSTETRICS & GYNECOLOGY

## 2024-03-20 PROCEDURE — G8484 FLU IMMUNIZE NO ADMIN: HCPCS | Performed by: OBSTETRICS & GYNECOLOGY

## 2024-03-20 RX ORDER — ESTRADIOL 0.1 MG/G
1 CREAM VAGINAL
Qty: 1 EACH | Refills: 11 | Status: SHIPPED | OUTPATIENT
Start: 2024-03-21

## 2024-03-26 LAB
COLLECTION METHOD: NORMAL
CYTOLOGIST CVX/VAG CYTO: NORMAL
CYTOLOGY CVX/VAG DOC THIN PREP: NORMAL
HPV REFLEX: NORMAL
Lab: NORMAL
PAP SOURCE: NORMAL
PATH REPORT.FINAL DX SPEC: NORMAL
PREV TREATMENT: NORMAL
STAT OF ADQ CVX/VAG CYTO-IMP: NORMAL

## 2024-03-27 ENCOUNTER — HOSPITAL ENCOUNTER (OUTPATIENT)
Dept: PHYSICAL THERAPY | Age: 54
Setting detail: RECURRING SERIES
Discharge: HOME | End: 2024-03-30
Payer: COMMERCIAL

## 2024-03-27 DIAGNOSIS — M62.81 MUSCLE WEAKNESS (GENERALIZED): Primary | ICD-10-CM

## 2024-03-27 DIAGNOSIS — M25.512 LEFT SHOULDER PAIN, UNSPECIFIED CHRONICITY: ICD-10-CM

## 2024-03-27 PROCEDURE — 97110 THERAPEUTIC EXERCISES: CPT

## 2024-03-27 PROCEDURE — 97161 PT EVAL LOW COMPLEX 20 MIN: CPT

## 2024-03-27 ASSESSMENT — PAIN SCALES - GENERAL: PAINLEVEL_OUTOF10: 1

## 2024-03-27 NOTE — THERAPY EVALUATION
cannot sleep wellat night.  I wake 5-6 times a night.    Patient Stated Goal(s):  \"Improve my Left arm pain\"  Initial Pain Level:      1/10   Post Session Pain Level:      1/10  Past Medical History/Comorbidities:   Ms. Franco  has a past medical history of Abnormal Pap smear of cervix, Anxiety, Basal cell carcinoma (BCC) in situ of skin, Breast disorder, Cancer (HCC), Chronic back pain, Contact dermatitis and eczema due to cause, Depression, High cholesterol, and Mitral valve prolapse.  Ms. Franco  has a past surgical history that includes Colonoscopy; skin biopsy; other surgical history (Right); other surgical history; Mohs surgery (06/15/2022); Cryotherapy; and hx vein ablation thermal (Right).  Social History/Living Environment:   Level of Assistance: Rehab: Independent    Prior Level of Function/Work/Activity:   Prior Level of Function: Independent      Learning:   Does the patient/guardian have any barriers to learning?: No barriers    Fall Risk Scale:   Garcia Total Score: 0    Personal Factors:        Sex:  female        Age:  53 y.o.      OBJECTIVE     Observation/Postural and Gait Assessment:  Fair posture, occasional numbness in fingers but not today.      Palpation:  Suprasinatus and bicep tendon.     ROM:   PROM in degrees Left Right   Shoulder flexion 155° 180°   Shoulder abduction  90° 100°   Shoulder internal rotation (IR)  (measured at 45 ° abduction) ° °   Shoulder external rotation (ER)  (measured 0° abduction) ° °   ER (measured at 45° degrees of abduction)  ° °   Apley's scratch test (functional IR AROM)       Passive Accessory Motion: Normal    Strength:   Manual Muscle Test (out of 5) Left Right   Shoulder scaption 4+ 5   Shoulder ER 4 5   Shoulder IR 4+ 5   Teres Minor     Infraspinatus      (in pounds) with a JARET hand-held dynamometer at position        Special Tests:    Impingement tests:  Cuadra-Victor Hugo test:  +  Neer test: +  Painful arc: +  Marshal:   Rotator Cuff:  Lift off test:

## 2024-03-27 NOTE — PROGRESS NOTES
Chandrika Franco  : 1970  Primary: [facility] (Commercial)  Secondary:  [facility] [place] [address] [place]  [place] [address]  Phone: [phone]  Fax: [phone] Plan Frequency: 2-3 x week    Plan of Care/Certification Expiration Date: 24        Plan of Care/Certification Expiration Date:  Plan of Care/Certification Expiration Date: 24    Frequency/Duration:   Plan Frequency: 2-3 x week      Time In/Out:   Time In: 930  Time Out: 1010      PT Visit Info:         Visit Count:  1    OUTPATIENT PHYSICAL THERAPY:   Treatment Note 3/27/2024       Episode  (left shoulder)               Treatment Diagnosis:    Muscle weakness (generalized)  Left shoulder pain, unspecified chronicity  Medical/Referring Diagnosis:    Chronic left shoulder pain [M25.512, G89.29]    Referring Physician:  Liz Butt APRN - NP MD Orders:  PT Eval and Treat   Return MD Appt:    Future Appointments   Date Time Provider Department Center   3/29/2024  2:30 PM [facility] [address] [facility]   2024  8:45 AM Soniya Palmer, PT [facility]        Date of Onset:  No data recorded   Allergies:   Simvastatin  Restrictions/Precautions:   Weight Bearing Status: FWB      Interventions Planned (Treatment may consist of any combination of the following):     See Assessment Note    Subjective Comments:   I need to know hwo to sleep at night.  Initial Pain Level::     1/10  Post Session Pain Level:       0/10  Medications Last Reviewed:  3/27/2024  Updated Objective Findings:  See Evaluation Note from today  Treatment   THERAPEUTIC EXERCISE: (23 minutes):    Exercises per grid below to improve mobility, strength, and balance.  Required minimal visual, verbal, and manual cues to promote proper body alignment and promote proper body posture.  Progressed resistance and range as indicated  .   Date:  3/27/24 Date:   Date:     Activity/Exercise Parameters Parameters

## 2024-03-29 ENCOUNTER — HOSPITAL ENCOUNTER (OUTPATIENT)
Dept: CT IMAGING | Age: 54
Discharge: HOME | End: 2024-04-01

## 2024-03-29 VITALS — WEIGHT: 180 LBS | BODY MASS INDEX: 33.13 KG/M2 | HEIGHT: 62 IN

## 2024-03-29 DIAGNOSIS — Z87.891 PERSONAL HISTORY OF TOBACCO USE: ICD-10-CM

## 2024-04-04 ENCOUNTER — HOSPITAL ENCOUNTER (OUTPATIENT)
Dept: PHYSICAL THERAPY | Age: 54
Setting detail: RECURRING SERIES
Discharge: HOME | End: 2024-04-07
Payer: COMMERCIAL

## 2024-04-04 PROCEDURE — 97110 THERAPEUTIC EXERCISES: CPT

## 2024-04-04 NOTE — PROGRESS NOTES
Chandrika Franco  : 1970  Primary: [facility] (Commercial)  Secondary:  [facility]  [address] [facility] [place] [address]  Phone: [phone]  Fax: [phone] Plan Frequency: 2-3 x week    Plan of Care/Certification Expiration Date: 24        Plan of Care/Certification Expiration Date:  Plan of Care/Certification Expiration Date: 24    Frequency/Duration:   Plan Frequency: 2-3 x week      Time In/Out:   Time In: 0845  Time Out: 925      PT Visit Info:         Visit Count:  2    OUTPATIENT PHYSICAL THERAPY:   Treatment Note 2024       Episode  (left shoulder)               Treatment Diagnosis:    No data found  Medical/Referring Diagnosis:    Chronic left shoulder pain [M25.512, G89.29]    Referring Physician:  Liz Butt APRN - NP MD Orders:  PT Eval and Treat   Return MD Appt:    No future appointments.       Date of Onset:  No data recorded   Allergies:   Simvastatin  Restrictions/Precautions:   Weight Bearing Status: FWB      Interventions Planned (Treatment may consist of any combination of the following):     See Assessment Note    Subjective Comments:   I am definitely doing better  Initial Pain Level::      1/10  Post Session Pain Level:       0/10  Medications Last Reviewed:  2024  Updated Objective Findings:  See Evaluation Note from today  Treatment   THERAPEUTIC EXERCISE: (24minutes):    Exercises per grid below to improve mobility, strength, and balance.  Required minimal visual, verbal, and manual cues to promote proper body alignment and promote proper body posture.  Progressed resistance and range as indicated  .   Date:  3/27/24 Date:  24 Date:     Activity/Exercise Parameters Parameters Parameters   Pulleys 2 minute 2 minutes    Rows 10x10   10x10\"    Shoulder taps  10x      ER red                        MANUAL THERAPY: (- minutes):   Joint mobilization and Soft tissue mobilization was utilized and

## 2024-04-09 ENCOUNTER — HOSPITAL ENCOUNTER (OUTPATIENT)
Dept: PHYSICAL THERAPY | Age: 54
Setting detail: RECURRING SERIES
End: 2024-04-09
Payer: COMMERCIAL

## 2024-04-11 ENCOUNTER — HOSPITAL ENCOUNTER (OUTPATIENT)
Dept: PHYSICAL THERAPY | Age: 54
Setting detail: RECURRING SERIES
Discharge: HOME | End: 2024-04-14
Payer: COMMERCIAL

## 2024-04-11 PROCEDURE — 97110 THERAPEUTIC EXERCISES: CPT

## 2024-04-11 NOTE — PROGRESS NOTES
Chandrika Franco  : 1970  Primary: [facility] (Commercial)  Secondary:  [facility] @ [place] [address] [facility] [place] [address]  Phone: [phone]  Fax: [phone] Plan Frequency: 2-3 x week    Plan of Care/Certification Expiration Date: 24        Plan of Care/Certification Expiration Date:  Plan of Care/Certification Expiration Date: 24    Frequency/Duration:   Plan Frequency: 2-3 x week      Time In/Out:   Time In: 1600  Time Out: 1645      PT Visit Info:         Visit Count:  3    OUTPATIENT PHYSICAL THERAPY:   Treatment Note 2024       Episode  (left shoulder)               Treatment Diagnosis:    No data found  Medical/Referring Diagnosis:    Chronic left shoulder pain [M25.512, G89.29]    Referring Physician:  Liz Butt APRN - NP MD Orders:  PT Eval and Treat   Return MD Appt:    Future Appointments   Date Time Provider Department Center   2024  9:30 AM Soniya Palmer, PT [facility]          Date of Onset:  No data recorded   Allergies:   Simvastatin  Restrictions/Precautions:   Weight Bearing Status: FWB      Interventions Planned (Treatment may consist of any combination of the following):     See Assessment Note    Subjective Comments:   I awonder if I overdid it with the exercises with   Initial Pain Level::      1/10  Post Session Pain Level:       0/10  Medications Last Reviewed:  2024  Updated Objective Findings:  See Evaluation Note from today  Treatment   THERAPEUTIC EXERCISE: (40 minutes):    Exercises per grid below to improve mobility, strength, and balance.  Required minimal visual, verbal, and manual cues to promote proper body alignment and promote proper body posture.  Progressed resistance and range as indicated  .   Date:  3/27/24 Date:  24 Date:  24   Activity/Exercise Parameters Parameters Parameters   Pulleys 2 minute 2 minutes 2 minutes   Rows 10x10   10x10\" 10x10\" red     Shoulder

## 2024-04-22 ENCOUNTER — APPOINTMENT (OUTPATIENT)
Dept: PHYSICAL THERAPY | Age: 54
End: 2024-04-22
Payer: COMMERCIAL

## 2024-04-23 ENCOUNTER — HOSPITAL ENCOUNTER (OUTPATIENT)
Dept: PHYSICAL THERAPY | Age: 54
Setting detail: RECURRING SERIES
Discharge: HOME | End: 2024-04-26
Payer: COMMERCIAL

## 2024-04-23 ENCOUNTER — TELEPHONE (OUTPATIENT)
Dept: FAMILY MEDICINE CLINIC | Facility: CLINIC | Age: 54
End: 2024-04-23

## 2024-04-23 DIAGNOSIS — G89.29 CHRONIC LEFT SHOULDER PAIN: Primary | ICD-10-CM

## 2024-04-23 DIAGNOSIS — M25.512 CHRONIC LEFT SHOULDER PAIN: Primary | ICD-10-CM

## 2024-04-23 PROCEDURE — 97140 MANUAL THERAPY 1/> REGIONS: CPT

## 2024-04-23 NOTE — PROGRESS NOTES
Chandrika Franco  : 1970  Primary: [facility] (Commercial)  Secondary:  [facility] [place] [address] [facility] [place] [address]  Phone: [phone]  Fax: [phone] Plan Frequency: 2-3 x week    Plan of Care/Certification Expiration Date: 24        Plan of Care/Certification Expiration Date:  Plan of Care/Certification Expiration Date: 24    Frequency/Duration:   Plan Frequency: 2-3 x week      Time In/Out:   Time In: 930  Time Out: 1014      PT Visit Info:         Visit Count:  4    OUTPATIENT PHYSICAL THERAPY:   Treatment Note 2024       Episode  (left shoulder)               Treatment Diagnosis:    No data found  Medical/Referring Diagnosis:    Chronic left shoulder pain [M25.512, G89.29]    Referring Physician:  Liz Butt APRN - NP MD Orders:  PT Eval and Treat   Return MD Appt:    Future Appointments   Date Time Provider Department Center   2024  9:30 AM Soniya Palmer, PT [facility]          Date of Onset:  No data recorded   Allergies:   Simvastatin  Restrictions/Precautions:   Weight Bearing Status: FWB      Interventions Planned (Treatment may consist of any combination of the following):     See Assessment Note    Subjective Comments:   Its just not getting all the way better.   Initial Pain Level::      1/10  Post Session Pain Level:       0/10  Medications Last Reviewed:  2024  Updated Objective Findings:  See Evaluation Note from today  Treatment   THERAPEUTIC EXERCISE: (--- minutes):    Exercises per grid below to improve mobility, strength, and balance.  Required minimal visual, verbal, and manual cues to promote proper body alignment and promote proper body posture.  Progressed resistance and range as indicated  .   Date:  3/27/24 Date:  24 Date:  24 Date:     Activity/Exercise Parameters Parameters Parameters    Pulleys 2 minute 2 minutes 2 minutes 2 minutes   Rows 10x10   10x10\" 10x10\" red

## 2024-04-23 NOTE — PROGRESS NOTES
Chandrika Franco no show for visit today.  Called at 9:02 AM and LVM.  No future appointments.    Will check in.

## 2024-04-23 NOTE — TELEPHONE ENCOUNTER
----- Message from Soniya Palmer PT sent at 4/23/2024  3:40 PM EDT -----  Regarding: RE: Referral to Ortho  Contact: [phone]  Hey there!!!  You're awesome.  L shoulder!        ----- Message -----  From: Simone Cadena MD  Sent: 4/23/2024  12:10 PM EDT  To: Soniya Palmer PT  Subject: RE: Referral to Ortho                            Hi,     REFUGIO Butt is out for the next two weeks.   I will put the referral in if you can let me know specifically which joint(s) are needing attention.     Simone Jaramillo      ----- Message -----  From: Soniya Palmer PT  Sent: 4/23/2024  10:10 AM EDT  To: SHAYY Aguiar NP  Subject: Referral to Ortho                                Hi there!  Would you be open to placing a referral to POA?  I believe she may need cortizone shot.  Improvement with manual, but she is still experiencing impingement affecting ROM:  )    Thank you and please let me know if it's okay.

## 2024-04-30 ENCOUNTER — HOSPITAL ENCOUNTER (OUTPATIENT)
Dept: PHYSICAL THERAPY | Age: 54
Setting detail: RECURRING SERIES
End: 2024-04-30
Payer: COMMERCIAL

## 2024-05-06 DIAGNOSIS — M25.512 CHRONIC LEFT SHOULDER PAIN: Primary | ICD-10-CM

## 2024-05-06 DIAGNOSIS — G89.29 CHRONIC LEFT SHOULDER PAIN: Primary | ICD-10-CM

## 2024-05-23 ENCOUNTER — OFFICE VISIT (OUTPATIENT)
Dept: ORTHOPEDIC SURGERY | Age: 54
End: 2024-05-23
Payer: COMMERCIAL

## 2024-05-23 DIAGNOSIS — M75.02 ADHESIVE CAPSULITIS OF LEFT SHOULDER: Primary | ICD-10-CM

## 2024-05-23 PROCEDURE — 3017F COLORECTAL CA SCREEN DOC REV: CPT | Performed by: PHYSICIAN ASSISTANT

## 2024-05-23 PROCEDURE — G8427 DOCREV CUR MEDS BY ELIG CLIN: HCPCS | Performed by: PHYSICIAN ASSISTANT

## 2024-05-23 PROCEDURE — G8417 CALC BMI ABV UP PARAM F/U: HCPCS | Performed by: PHYSICIAN ASSISTANT

## 2024-05-23 PROCEDURE — 4004F PT TOBACCO SCREEN RCVD TLK: CPT | Performed by: PHYSICIAN ASSISTANT

## 2024-05-23 PROCEDURE — 99204 OFFICE O/P NEW MOD 45 MIN: CPT | Performed by: PHYSICIAN ASSISTANT

## 2024-05-23 PROCEDURE — 20610 DRAIN/INJ JOINT/BURSA W/O US: CPT | Performed by: PHYSICIAN ASSISTANT

## 2024-05-23 RX ORDER — METHYLPREDNISOLONE ACETATE 40 MG/ML
80 INJECTION, SUSPENSION INTRA-ARTICULAR; INTRALESIONAL; INTRAMUSCULAR; SOFT TISSUE ONCE
Status: COMPLETED | OUTPATIENT
Start: 2024-05-23 | End: 2024-05-24

## 2024-05-24 RX ADMIN — METHYLPREDNISOLONE ACETATE 80 MG: 40 INJECTION, SUSPENSION INTRA-ARTICULAR; INTRALESIONAL; INTRAMUSCULAR; SOFT TISSUE at 11:30

## 2024-05-27 NOTE — PROGRESS NOTES
and negatives are addressed with the patient, particularly those related to musculoskeletal concerns.  Non-orthopaedic concerns were referred back to the primary care physician.    PE:    GEN: General appearance is that of a healthy patient, alert and oriented, in no distress. WDWN.  HEENT:  Normocephalic, atraumatic.  Extraocular muscles are intact.  Neck:  Supple, no lymphadenopathy.  Heart:  Regular pulse exam on all extremities.  Good color and warmth noted.  Lungs:  Normal non-labored breathing with no obvious shortness of breath.  Abdomen:  WNL's.  Skin:  No signs of rash or bruising.    Pysch: Answers questions appropriately, AO X 3.  MSK:  Cervical spine: No tenderness. Good active motion. Negative bilateral Spurling's maneuver     SHOULDER   Left (involved)  Right   Skin Intact Intact   Radial Pulses 2+ symmetrical  2+ symmetrical   Myotomes Normal Normal   Dermatomes  Normal Normal   ROM Decreased. IR to side of back pocket. , abduction 75  Full   Strength Limited in abduction secondary to pain. No weakness IR and ER No weakness   Atrophy None noted None noted   Effusion/Swelling  none None   Palpation Mild anterior  No Tenderness   Bicep Tendon Rupture  Negative Negative   Bear Hug, Belly Press Not tested Not tested   Crossed Arm Adduction Test Not tested Not tested   Instability/Ant. Apprehension Test Not tested None   Impingement limited Negative          X-rays:   AP, internal rotation, outlet views of the Left shoulder were obtained 3-18-24 and reviewed independently in the office. No evidence of arthritis, fracture, dislocation, loose body or other abnormality.     X-ray impression: Left shoulder appears relatively normal.      Assessment:     ICD-10-CM    1. Adhesive capsulitis of left shoulder  M75.02 Ambulatory referral to Physical Therapy     methylPREDNISolone acetate (DEPO-MEDROL) injection 80 mg     DRAIN/INJECT LARGE JOINT/BURSA          Plan:  I had a long discussion with the patient

## 2024-12-26 ENCOUNTER — TELEPHONE (OUTPATIENT)
Dept: FAMILY MEDICINE CLINIC | Facility: CLINIC | Age: 54
End: 2024-12-26

## 2024-12-26 NOTE — TELEPHONE ENCOUNTER
Patient sent [email or web address] appointment request on 12/24/2024 for sx: I have been having pressure under my left breast down near my rib cage area        called today, no answer. LVM for pt to cb for  to get more clarification on the symptoms she is experiencing/ triage if needed- also responded via [email or web address]    Covering Clinical staff member, KELSEY BARTLETT has been notified as well

## 2024-12-26 NOTE — TELEPHONE ENCOUNTER
Pt called back, no triage sx has had a lot of things going on the last few weeks.     Scheduled appt for next avail with PCP 01/02/2025

## 2025-01-01 SDOH — ECONOMIC STABILITY: FOOD INSECURITY: WITHIN THE PAST 12 MONTHS, THE FOOD YOU BOUGHT JUST DIDN'T LAST AND YOU DIDN'T HAVE MONEY TO GET MORE.: NEVER TRUE

## 2025-01-01 SDOH — ECONOMIC STABILITY: INCOME INSECURITY: HOW HARD IS IT FOR YOU TO PAY FOR THE VERY BASICS LIKE FOOD, HOUSING, MEDICAL CARE, AND HEATING?: NOT HARD AT ALL

## 2025-01-01 SDOH — ECONOMIC STABILITY: FOOD INSECURITY: WITHIN THE PAST 12 MONTHS, YOU WORRIED THAT YOUR FOOD WOULD RUN OUT BEFORE YOU GOT MONEY TO BUY MORE.: NEVER TRUE

## 2025-01-01 SDOH — ECONOMIC STABILITY: TRANSPORTATION INSECURITY
IN THE PAST 12 MONTHS, HAS LACK OF TRANSPORTATION KEPT YOU FROM MEETINGS, WORK, OR FROM GETTING THINGS NEEDED FOR DAILY LIVING?: NO

## 2025-01-02 ENCOUNTER — OFFICE VISIT (OUTPATIENT)
Dept: FAMILY MEDICINE CLINIC | Facility: CLINIC | Age: 55
End: 2025-01-02

## 2025-01-02 VITALS
HEIGHT: 62 IN | HEART RATE: 72 BPM | WEIGHT: 190 LBS | TEMPERATURE: 98.2 F | SYSTOLIC BLOOD PRESSURE: 115 MMHG | BODY MASS INDEX: 34.96 KG/M2 | DIASTOLIC BLOOD PRESSURE: 80 MMHG

## 2025-01-02 DIAGNOSIS — F32.A ANXIETY AND DEPRESSION: Primary | ICD-10-CM

## 2025-01-02 DIAGNOSIS — F41.9 ANXIETY AND DEPRESSION: Primary | ICD-10-CM

## 2025-01-02 DIAGNOSIS — E78.00 PURE HYPERCHOLESTEROLEMIA, UNSPECIFIED: ICD-10-CM

## 2025-01-02 RX ORDER — ROSUVASTATIN CALCIUM 5 MG/1
5 TABLET, COATED ORAL DAILY
Qty: 90 TABLET | Refills: 1 | Status: SHIPPED | OUTPATIENT
Start: 2025-01-02

## 2025-01-02 RX ORDER — ATORVASTATIN CALCIUM 20 MG/1
20 TABLET, FILM COATED ORAL DAILY
Qty: 90 TABLET | Refills: 1 | Status: CANCELLED | OUTPATIENT
Start: 2025-01-02

## 2025-01-02 ASSESSMENT — PATIENT HEALTH QUESTIONNAIRE - PHQ9
SUM OF ALL RESPONSES TO PHQ9 QUESTIONS 1 & 2: 0
SUM OF ALL RESPONSES TO PHQ QUESTIONS 1-9: 0
1. LITTLE INTEREST OR PLEASURE IN DOING THINGS: NOT AT ALL
2. FEELING DOWN, DEPRESSED OR HOPELESS: NOT AT ALL
SUM OF ALL RESPONSES TO PHQ QUESTIONS 1-9: 0

## 2025-01-02 NOTE — PROGRESS NOTES
Pulses: Normal pulses.      Heart sounds: Normal heart sounds.      Comments: No chest wall or abdominal pain with palpation  Pulmonary:      Effort: Pulmonary effort is normal.      Breath sounds: Normal breath sounds.   Chest:          Comments: Cystic lesion left lower breast bilateral ropey tissue without discrete masses  Abdominal:      General: Bowel sounds are normal. There is no distension.      Tenderness: There is no abdominal tenderness.   Skin:     Comments: Cystic lesions on under breast area not inflammed   Neurological:      Mental Status: She is alert.                /80 (Site: Left Upper Arm, Position: Sitting, Cuff Size: Large Adult)   Pulse 72   Temp 98.2 °F (36.8 °C) (Temporal)   Ht 1.575 m (5' 2\")   Wt 86.2 kg (190 lb)   LMP 10/04/2022   BMI 34.75 kg/m²    An electronic signature was used to authenticate this note.    --SHAYY Aguiar - NP

## 2025-02-28 ENCOUNTER — OFFICE VISIT (OUTPATIENT)
Dept: FAMILY MEDICINE CLINIC | Facility: CLINIC | Age: 55
End: 2025-02-28

## 2025-02-28 ENCOUNTER — LAB (OUTPATIENT)
Dept: FAMILY MEDICINE CLINIC | Facility: CLINIC | Age: 55
End: 2025-02-28

## 2025-02-28 VITALS
SYSTOLIC BLOOD PRESSURE: 115 MMHG | WEIGHT: 189 LBS | DIASTOLIC BLOOD PRESSURE: 75 MMHG | BODY MASS INDEX: 34.78 KG/M2 | HEIGHT: 62 IN | HEART RATE: 66 BPM | TEMPERATURE: 98.5 F

## 2025-02-28 DIAGNOSIS — E55.9 VITAMIN D DEFICIENCY: ICD-10-CM

## 2025-02-28 DIAGNOSIS — Z00.00 ANNUAL PHYSICAL EXAM: ICD-10-CM

## 2025-02-28 DIAGNOSIS — F41.9 ANXIETY AND DEPRESSION: Primary | ICD-10-CM

## 2025-02-28 DIAGNOSIS — R20.9 ALTERATION IN SKIN SENSATION: ICD-10-CM

## 2025-02-28 DIAGNOSIS — F32.A ANXIETY AND DEPRESSION: Primary | ICD-10-CM

## 2025-02-28 LAB
25(OH)D3 SERPL-MCNC: 25.5 NG/ML (ref 30–100)
ALBUMIN SERPL-MCNC: 3.8 G/DL (ref 3.5–5)
ALBUMIN/GLOB SERPL: 1.3 (ref 1–1.9)
ALP SERPL-CCNC: 105 U/L (ref 35–104)
ALT SERPL-CCNC: 23 U/L (ref 8–45)
ANION GAP SERPL CALC-SCNC: 10 MMOL/L (ref 7–16)
AST SERPL-CCNC: 19 U/L (ref 15–37)
BILIRUB SERPL-MCNC: 0.4 MG/DL (ref 0–1.2)
BUN SERPL-MCNC: 14 MG/DL (ref 6–23)
CALCIUM SERPL-MCNC: 9.2 MG/DL (ref 8.8–10.2)
CHLORIDE SERPL-SCNC: 105 MMOL/L (ref 98–107)
CHOLEST SERPL-MCNC: 207 MG/DL (ref 0–200)
CO2 SERPL-SCNC: 27 MMOL/L (ref 20–29)
CREAT SERPL-MCNC: 0.67 MG/DL (ref 0.6–1.1)
GLOBULIN SER CALC-MCNC: 3.1 G/DL (ref 2.3–3.5)
GLUCOSE SERPL-MCNC: 102 MG/DL (ref 70–99)
HDLC SERPL-MCNC: 53 MG/DL (ref 40–60)
HDLC SERPL: 3.9 (ref 0–5)
LDLC SERPL CALC-MCNC: 129 MG/DL (ref 0–100)
POTASSIUM SERPL-SCNC: 4.8 MMOL/L (ref 3.5–5.1)
PROT SERPL-MCNC: 6.9 G/DL (ref 6.3–8.2)
SODIUM SERPL-SCNC: 142 MMOL/L (ref 136–145)
TRIGL SERPL-MCNC: 126 MG/DL (ref 0–150)
VIT B12 SERPL-MCNC: 595 PG/ML (ref 193–986)
VLDLC SERPL CALC-MCNC: 25 MG/DL (ref 6–23)

## 2025-02-28 SDOH — HEALTH STABILITY: PHYSICAL HEALTH: ON AVERAGE, HOW MANY DAYS PER WEEK DO YOU ENGAGE IN MODERATE TO STRENUOUS EXERCISE (LIKE A BRISK WALK)?: 5 DAYS

## 2025-02-28 SDOH — ECONOMIC STABILITY: FOOD INSECURITY: WITHIN THE PAST 12 MONTHS, THE FOOD YOU BOUGHT JUST DIDN'T LAST AND YOU DIDN'T HAVE MONEY TO GET MORE.: NEVER TRUE

## 2025-02-28 SDOH — HEALTH STABILITY: PHYSICAL HEALTH: ON AVERAGE, HOW MANY MINUTES DO YOU ENGAGE IN EXERCISE AT THIS LEVEL?: 60 MIN

## 2025-02-28 SDOH — ECONOMIC STABILITY: FOOD INSECURITY: WITHIN THE PAST 12 MONTHS, YOU WORRIED THAT YOUR FOOD WOULD RUN OUT BEFORE YOU GOT MONEY TO BUY MORE.: NEVER TRUE

## 2025-02-28 ASSESSMENT — ENCOUNTER SYMPTOMS
COUGH: 0
ABDOMINAL PAIN: 0
CONSTIPATION: 0
SINUS PRESSURE: 0
WHEEZING: 0
VOICE CHANGE: 0
ANAL BLEEDING: 0
SORE THROAT: 0
ABDOMINAL DISTENTION: 0
DIARRHEA: 0
CHEST TIGHTNESS: 0
RECTAL PAIN: 0
BACK PAIN: 0
EYE PAIN: 0
RHINORRHEA: 0
VOMITING: 0
BLOOD IN STOOL: 0
NAUSEA: 1
PHOTOPHOBIA: 0
SINUS PAIN: 0
TROUBLE SWALLOWING: 0
SHORTNESS OF BREATH: 1
EYE REDNESS: 0

## 2025-02-28 ASSESSMENT — SOCIAL DETERMINANTS OF HEALTH (SDOH)
DO YOU BELONG TO ANY CLUBS OR ORGANIZATIONS SUCH AS CHURCH GROUPS UNIONS, FRATERNAL OR ATHLETIC GROUPS, OR SCHOOL GROUPS?: NO
WITHIN THE LAST YEAR, HAVE YOU BEEN AFRAID OF YOUR PARTNER OR EX-PARTNER?: NO
HOW OFTEN DO YOU ATTENT MEETINGS OF THE CLUB OR ORGANIZATION YOU BELONG TO?: NEVER
WITHIN THE LAST YEAR, HAVE TO BEEN RAPED OR FORCED TO HAVE ANY KIND OF SEXUAL ACTIVITY BY YOUR PARTNER OR EX-PARTNER?: NO
WITHIN THE LAST YEAR, HAVE YOU BEEN KICKED, HIT, SLAPPED, OR OTHERWISE PHYSICALLY HURT BY YOUR PARTNER OR EX-PARTNER?: NO
WITHIN THE LAST YEAR, HAVE YOU BEEN HUMILIATED OR EMOTIONALLY ABUSED IN OTHER WAYS BY YOUR PARTNER OR EX-PARTNER?: YES
WITHIN THE LAST YEAR, HAVE YOU BEEN HUMILIATED OR EMOTIONALLY ABUSED IN OTHER WAYS BY YOUR PARTNER OR EX-PARTNER?: NO
HOW OFTEN DO YOU ATTEND CHURCH OR RELIGIOUS SERVICES?: NEVER
WITHIN THE LAST YEAR, HAVE YOU BEEN AFRAID OF YOUR PARTNER OR EX-PARTNER?: NO
HOW OFTEN DO YOU GET TOGETHER WITH FRIENDS OR RELATIVES?: TWICE A WEEK
WITHIN THE LAST YEAR, HAVE YOU BEEN KICKED, HIT, SLAPPED, OR OTHERWISE PHYSICALLY HURT BY YOUR PARTNER OR EX-PARTNER?: NO
WITHIN THE LAST YEAR, HAVE TO BEEN RAPED OR FORCED TO HAVE ANY KIND OF SEXUAL ACTIVITY BY YOUR PARTNER OR EX-PARTNER?: NO
HOW HARD IS IT FOR YOU TO PAY FOR THE VERY BASICS LIKE FOOD, HOUSING, MEDICAL CARE, AND HEATING?: NOT HARD AT ALL
IN A TYPICAL WEEK, HOW MANY TIMES DO YOU TALK ON THE PHONE WITH FAMILY, FRIENDS, OR NEIGHBORS?: THREE TIMES A WEEK

## 2025-02-28 NOTE — PROGRESS NOTES
Right: Normal.      Left: Normal.   Abdominal:      Palpations: Abdomen is soft.      Tenderness: There is no abdominal tenderness.   Genitourinary:     Comments: deferred  Musculoskeletal:         General: Normal range of motion.      Cervical back: Normal range of motion and neck supple.   Lymphadenopathy:      Upper Body:      Right upper body: No supraclavicular or axillary adenopathy.      Left upper body: No supraclavicular or axillary adenopathy.   Skin:     General: Skin is warm and dry.      Capillary Refill: Capillary refill takes less than 2 seconds.   Neurological:      General: No focal deficit present.      Mental Status: She is alert and oriented to person, place, and time.   Psychiatric:         Mood and Affect: Mood normal.         Behavior: Behavior normal.         Thought Content: Thought content normal.         Judgment: Judgment normal.                /75 (Site: Right Upper Arm, Position: Sitting, Cuff Size: Large Adult)   Pulse 66   Temp 98.5 °F (36.9 °C) (Temporal)   Ht 1.575 m (5' 2\")   Wt 85.7 kg (189 lb)   LMP 10/04/2022   BMI 34.57 kg/m²    An electronic signature was used to authenticate this note.    --SHAYY Aguiar - NP

## 2025-03-03 ENCOUNTER — TELEPHONE (OUTPATIENT)
Dept: FAMILY MEDICINE CLINIC | Facility: CLINIC | Age: 55
End: 2025-03-03

## 2025-03-06 NOTE — TELEPHONE ENCOUNTER
Chandrika [facility] Clinical Staff (supporting You)6 minutes ago (2:08 PM)       Thank you for sending these on Otilia. I will continue to take my statin in order to get the cholesterol down and watch my sugar intake.     While I was in the office last week i spoke with Liz about the ongoing muscle aches that I have throughout my body that have been going on for the past four years. None of the labs once again indicate any issues so I am inclined to believe that it might really actually be hormonal related as the aches and pains began around the same time as I was in the height of menopause and have not subsided since I stopped having my menstrual cycle a year and a half ago. I have toyed with going on HRT for a while now but wanted to rule out any other possible deficiencies that could be causing it but I am really at a loss and nothing has shown up that could be causing the pain.     So long story short, please send this on to Liz and let her know that I would like to go on Hormone Replacement Therapy for a three month trial to see if this reduces or eliminates the pains I have been experiencing.       Look forward to hearing back.     Kind regards,  Chandrika

## 2025-06-18 ENCOUNTER — PATIENT MESSAGE (OUTPATIENT)
Dept: ORTHOPEDIC SURGERY | Age: 55
End: 2025-06-18

## 2025-06-25 ENCOUNTER — OFFICE VISIT (OUTPATIENT)
Dept: ORTHOPEDIC SURGERY | Age: 55
End: 2025-06-25

## 2025-06-25 DIAGNOSIS — M25.511 BILATERAL SHOULDER PAIN, UNSPECIFIED CHRONICITY: ICD-10-CM

## 2025-06-25 DIAGNOSIS — M25.512 BILATERAL SHOULDER PAIN, UNSPECIFIED CHRONICITY: ICD-10-CM

## 2025-06-25 DIAGNOSIS — M75.01 ADHESIVE CAPSULITIS OF RIGHT SHOULDER: Primary | ICD-10-CM

## 2025-06-25 RX ORDER — MELOXICAM 15 MG/1
TABLET ORAL
Qty: 14 TABLET | Refills: 0 | OUTPATIENT
Start: 2025-06-25

## 2025-06-25 RX ORDER — METHYLPREDNISOLONE ACETATE 40 MG/ML
80 INJECTION, SUSPENSION INTRA-ARTICULAR; INTRALESIONAL; INTRAMUSCULAR; SOFT TISSUE ONCE
Status: COMPLETED | OUTPATIENT
Start: 2025-06-25 | End: 2025-06-25

## 2025-06-25 RX ORDER — MELOXICAM 15 MG/1
15 TABLET ORAL DAILY
Qty: 14 TABLET | Refills: 0 | Status: SHIPPED | OUTPATIENT
Start: 2025-06-25 | End: 2025-07-09

## 2025-06-25 RX ADMIN — METHYLPREDNISOLONE ACETATE 80 MG: 40 INJECTION, SUSPENSION INTRA-ARTICULAR; INTRALESIONAL; INTRAMUSCULAR; SOFT TISSUE at 10:41

## 2025-06-25 NOTE — PROGRESS NOTES
Name: Chandrika Franco  YOB: 1970  Gender: female  MRN: [id number]    CC:   Chief Complaint   Patient presents with    Shoulder Pain     right    Right shoulder pain    History of Present Illness  The patient, a 55-year-old female, presents for evaluation of right shoulder arthralgia.    She reports significant alleviation of left/contralateral shoulder pain following an intra-articular injection administered last year. However, she continues to experience residual discomfort and difficulty with posterior reaching. Additionally, she reports upper body muscle weakness, which she attributes to menopause. Historically, she engaged in heavy lifting and moving activities, including trade shows, but is currently unable to perform these tasks. Approximately 6 months ago, she began experiencing similar symptoms in her right shoulder, without any antecedent trauma. The pain has progressively worsened, exacerbated by sudden or quick movements or reaching for distant objects. The pain is localized to the anterior aspect of the shoulder and radiates superiorly through the musculature. She denies any associated popping, clicking, grinding, paresthesia, or dysesthesia. The severity of the pain disrupts her sleep. Despite adherence to home exercises and physical therapy, she reports no improvement over the past 6 months. She finds symptomatic relief from hot baths in the morning. She does not take any oral medications or anti-inflammatory agents. She expresses concern that her sleeping position may be exacerbating her symptoms, noting increased pain upon awakening. Additionally, she reports generalized myalgia, which she believes may be related to menopause. A rheumatological workup conducted in 2022 did not reveal any significant findings.  Records were reviewed. She has been advised that her symptoms may be indicative of post-COVID syndrome, menopause and fibromyalgia. She has increased her vitamin intake

## 2025-07-24 ENCOUNTER — OFFICE VISIT (OUTPATIENT)
Dept: FAMILY MEDICINE CLINIC | Facility: CLINIC | Age: 55
End: 2025-07-24
Payer: COMMERCIAL

## 2025-07-24 ENCOUNTER — LAB (OUTPATIENT)
Dept: FAMILY MEDICINE CLINIC | Facility: CLINIC | Age: 55
End: 2025-07-24

## 2025-07-24 VITALS
DIASTOLIC BLOOD PRESSURE: 79 MMHG | HEIGHT: 62 IN | SYSTOLIC BLOOD PRESSURE: 126 MMHG | HEART RATE: 70 BPM | BODY MASS INDEX: 34.78 KG/M2 | TEMPERATURE: 97.6 F | WEIGHT: 189 LBS

## 2025-07-24 DIAGNOSIS — E78.00 PURE HYPERCHOLESTEROLEMIA, UNSPECIFIED: ICD-10-CM

## 2025-07-24 DIAGNOSIS — M47.816 FACET ARTHROPATHY, LUMBAR: Primary | ICD-10-CM

## 2025-07-24 LAB
ALBUMIN SERPL-MCNC: 3.7 G/DL (ref 3.5–5)
ALBUMIN/GLOB SERPL: 1 (ref 1–1.9)
ALP SERPL-CCNC: 110 U/L (ref 35–104)
ALT SERPL-CCNC: 17 U/L (ref 8–45)
ANION GAP SERPL CALC-SCNC: 12 MMOL/L (ref 7–16)
AST SERPL-CCNC: 20 U/L (ref 15–37)
BILIRUB SERPL-MCNC: 0.5 MG/DL (ref 0–1.2)
BUN SERPL-MCNC: 10 MG/DL (ref 6–23)
CALCIUM SERPL-MCNC: 9.5 MG/DL (ref 8.8–10.2)
CHLORIDE SERPL-SCNC: 102 MMOL/L (ref 98–107)
CHOLEST SERPL-MCNC: 189 MG/DL (ref 0–200)
CO2 SERPL-SCNC: 27 MMOL/L (ref 20–29)
CREAT SERPL-MCNC: 0.6 MG/DL (ref 0.6–1.1)
GLOBULIN SER CALC-MCNC: 3.5 G/DL (ref 2.3–3.5)
GLUCOSE SERPL-MCNC: 99 MG/DL (ref 70–99)
HDLC SERPL-MCNC: 56 MG/DL (ref 40–60)
HDLC SERPL: 3.4 (ref 0–5)
LDLC SERPL CALC-MCNC: 105 MG/DL (ref 0–100)
POTASSIUM SERPL-SCNC: 4.8 MMOL/L (ref 3.5–5.1)
PROT SERPL-MCNC: 7.2 G/DL (ref 6.3–8.2)
SODIUM SERPL-SCNC: 141 MMOL/L (ref 136–145)
TRIGL SERPL-MCNC: 141 MG/DL (ref 0–150)
VLDLC SERPL CALC-MCNC: 28 MG/DL (ref 6–23)

## 2025-07-24 PROCEDURE — G8427 DOCREV CUR MEDS BY ELIG CLIN: HCPCS | Performed by: NURSE PRACTITIONER

## 2025-07-24 PROCEDURE — 4004F PT TOBACCO SCREEN RCVD TLK: CPT | Performed by: NURSE PRACTITIONER

## 2025-07-24 PROCEDURE — 99214 OFFICE O/P EST MOD 30 MIN: CPT | Performed by: NURSE PRACTITIONER

## 2025-07-24 PROCEDURE — 3017F COLORECTAL CA SCREEN DOC REV: CPT | Performed by: NURSE PRACTITIONER

## 2025-07-24 PROCEDURE — G8417 CALC BMI ABV UP PARAM F/U: HCPCS | Performed by: NURSE PRACTITIONER

## 2025-07-24 RX ORDER — MELOXICAM 7.5 MG/1
7.5 TABLET ORAL DAILY
Qty: 90 TABLET | Refills: 0 | Status: SHIPPED | OUTPATIENT
Start: 2025-07-24 | End: 2025-10-22

## 2025-07-24 RX ORDER — ROSUVASTATIN CALCIUM 5 MG/1
5 TABLET, COATED ORAL DAILY
Qty: 90 TABLET | Refills: 1 | Status: SHIPPED | OUTPATIENT
Start: 2025-07-24

## 2025-07-24 NOTE — PROGRESS NOTES
Chandrika Franco (:  1970) is a 55 y.o. female,Established patient, here for evaluation of the following chief complaint(s):  Cholesterol Problem (F4 6 mo fu/lab/med refill )         Assessment & Plan  Pure hypercholesterolemia, unspecified   Chronic, at goal (stable), continue current treatment plan    Orders:    rosuvastatin (CRESTOR) 5 MG tablet; Take 1 tablet by mouth daily    Comprehensive Metabolic Panel; Future    Lipid Panel; Future    Facet arthropathy, lumbar   Chronic, not at goal (unstable), will try lower daily dose of nsaid take with food avoid other nsaids will watch renal function    Orders:    meloxicam (MOBIC) 7.5 MG tablet; Take 1 tablet by mouth daily      Return in about 6 months (around 2026) for fasting labs/med refill.       Subjective   HPI She is here for follow up of starting a stain for her cholesterol  Continues to almanzar joint pain was recently on mobic for her shoulder and it really helped her pain all over. Is it safe to take daily?    Review of Systems   Joint pain shoulders back      Objective   Physical Exam  Vitals and nursing note reviewed.   Constitutional:       Appearance: Normal appearance.      Comments: overweight   HENT:      Head: Normocephalic.   Cardiovascular:      Rate and Rhythm: Normal rate and regular rhythm.      Pulses: Normal pulses.      Heart sounds: Normal heart sounds.   Pulmonary:      Effort: Pulmonary effort is normal.      Breath sounds: Normal breath sounds.   Musculoskeletal:         General: Normal range of motion.      Cervical back: Normal range of motion.   Skin:     General: Skin is warm and dry.   Neurological:      Mental Status: She is alert.   Psychiatric:         Mood and Affect: Mood normal.         Behavior: Behavior normal.         Thought Content: Thought content normal.         Judgment: Judgment normal.            /79 (BP Site: Left Upper Arm, Patient Position: Sitting, BP Cuff Size: Large Adult)   Pulse 70

## 2025-07-24 NOTE — ASSESSMENT & PLAN NOTE
Chronic, not at goal (unstable), will try lower daily dose of nsaid take with food avoid other nsaids will watch renal function    Orders:    meloxicam (MOBIC) 7.5 MG tablet; Take 1 tablet by mouth daily

## 2025-07-25 ENCOUNTER — RESULTS FOLLOW-UP (OUTPATIENT)
Dept: FAMILY MEDICINE CLINIC | Facility: CLINIC | Age: 55
End: 2025-07-25

## 2025-07-25 NOTE — TELEPHONE ENCOUNTER
----- Message from SHAYY Lehman NP sent at 7/25/2025  7:51 AM EDT -----  Ldl still up some are your taking the crestor 5 every day?

## 2025-07-29 NOTE — PROGRESS NOTES
HPI    Chandrika Franco is a 55 y.o. female seen for annual GYN exam.    Past Medical History, Past Surgical History, Family history, Social History, and Medications were all reviewed with the patient today and updated as necessary.     Current Outpatient Medications   Medication Sig    rosuvastatin (CRESTOR) 5 MG tablet Take 1 tablet by mouth daily    UNABLE TO FIND GEM --1 chew per day    meloxicam (MOBIC) 7.5 MG tablet Take 1 tablet by mouth daily (Patient not taking: Reported on 7/30/2025)     No current facility-administered medications for this visit.     No Known Allergies  Past Medical History:   Diagnosis Date    Abnormal Pap smear of cervix     Anxiety 1987    Basal cell carcinoma (BCC) in situ of skin     Breast disorder infections    Cancer (HCC) basal skin cancer on face 2021    Chronic back pain 2020    Contact dermatitis and eczema due to cause     uses head and shoulders    Depression 1984    High cholesterol     Mitral valve prolapse      Past Surgical History:   Procedure Laterality Date    COLONOSCOPY      2019    CRYOTHERAPY      HX VEIN ABLATION THERMAL Right     2023    MOHS SURGERY  06/15/2022    nose, BCC    OTHER SURGICAL HISTORY Right     infection in breast - surgically removed.    OTHER SURGICAL HISTORY      cerclage X2    SKIN BIOPSY       Family History   Problem Relation Age of Onset    Deafness Mother     Heart Disease Mother 72        open heart surgery    Arthritis Mother     Atrial Fibrillation Mother     Depression Mother     High Cholesterol Mother     Vision Loss Mother     Migraines Mother     Deafness Father     Heart Disease Father         first MI mid 40s. was a smoker and heavy drinker when he was younger    Arthritis Father     Atrial Fibrillation Father     Hearing Loss Father     Heart Attack Father     High Cholesterol Father     Other Brother         back problems    Other Daughter         hives    Arthritis Daughter     No Known Problems Son     Breast Cancer

## 2025-07-30 ENCOUNTER — OFFICE VISIT (OUTPATIENT)
Dept: OBGYN CLINIC | Age: 55
End: 2025-07-30
Payer: COMMERCIAL

## 2025-07-30 VITALS
HEIGHT: 62 IN | WEIGHT: 188 LBS | BODY MASS INDEX: 34.6 KG/M2 | DIASTOLIC BLOOD PRESSURE: 78 MMHG | SYSTOLIC BLOOD PRESSURE: 124 MMHG

## 2025-07-30 DIAGNOSIS — Z12.4 ROUTINE CERVICAL SMEAR: ICD-10-CM

## 2025-07-30 DIAGNOSIS — Z01.419 ENCOUNTER FOR WELL WOMAN EXAM WITH ROUTINE GYNECOLOGICAL EXAM: Primary | ICD-10-CM

## 2025-07-30 DIAGNOSIS — Z12.31 SCREENING MAMMOGRAM, ENCOUNTER FOR: ICD-10-CM

## 2025-07-30 PROCEDURE — 99396 PREV VISIT EST AGE 40-64: CPT | Performed by: OBSTETRICS & GYNECOLOGY

## 2025-07-30 PROCEDURE — 99459 PELVIC EXAMINATION: CPT | Performed by: OBSTETRICS & GYNECOLOGY

## 2025-08-04 LAB
COLLECTION METHOD: NORMAL
CYTOLOGIST CVX/VAG CYTO: NORMAL
CYTOLOGY CVX/VAG DOC THIN PREP: NORMAL
DATE OF LMP: 0
HPV REFLEX: NORMAL
Lab: NORMAL
OTHER PT INFO: NORMAL
PAP SOURCE: NORMAL
PATH REPORT.FINAL DX SPEC: NORMAL
PREV TREATMENT: NORMAL
STAT OF ADQ CVX/VAG CYTO-IMP: NORMAL

## 2025-08-05 ENCOUNTER — RESULTS FOLLOW-UP (OUTPATIENT)
Dept: FAMILY MEDICINE CLINIC | Facility: CLINIC | Age: 55
End: 2025-08-05

## 2025-08-06 ENCOUNTER — OFFICE VISIT (OUTPATIENT)
Dept: ORTHOPEDIC SURGERY | Age: 55
End: 2025-08-06
Payer: COMMERCIAL

## 2025-08-06 DIAGNOSIS — M75.02 ADHESIVE CAPSULITIS OF LEFT SHOULDER: ICD-10-CM

## 2025-08-06 DIAGNOSIS — M75.01 ADHESIVE CAPSULITIS OF RIGHT SHOULDER: Primary | ICD-10-CM

## 2025-08-06 PROCEDURE — G8417 CALC BMI ABV UP PARAM F/U: HCPCS | Performed by: PHYSICIAN ASSISTANT

## 2025-08-06 PROCEDURE — 3017F COLORECTAL CA SCREEN DOC REV: CPT | Performed by: PHYSICIAN ASSISTANT

## 2025-08-06 PROCEDURE — 99213 OFFICE O/P EST LOW 20 MIN: CPT | Performed by: PHYSICIAN ASSISTANT

## 2025-08-06 PROCEDURE — 4004F PT TOBACCO SCREEN RCVD TLK: CPT | Performed by: PHYSICIAN ASSISTANT

## 2025-08-06 PROCEDURE — G8428 CUR MEDS NOT DOCUMENT: HCPCS | Performed by: PHYSICIAN ASSISTANT
